# Patient Record
Sex: FEMALE | Race: WHITE | Employment: FULL TIME | URBAN - METROPOLITAN AREA
[De-identification: names, ages, dates, MRNs, and addresses within clinical notes are randomized per-mention and may not be internally consistent; named-entity substitution may affect disease eponyms.]

---

## 2021-03-19 ENCOUNTER — IMMUNIZATION (OUTPATIENT)
Dept: PRIMARY CARE CLINIC | Age: 41
End: 2021-03-19
Payer: COMMERCIAL

## 2021-03-19 PROCEDURE — 0011A COVID-19, MODERNA VACCINE 100MCG/0.5ML DOSE: CPT | Performed by: NURSE PRACTITIONER

## 2021-03-19 PROCEDURE — 91301 COVID-19, MODERNA VACCINE 100MCG/0.5ML DOSE: CPT | Performed by: NURSE PRACTITIONER

## 2021-04-19 ENCOUNTER — IMMUNIZATION (OUTPATIENT)
Dept: PRIMARY CARE CLINIC | Age: 41
End: 2021-04-19
Payer: COMMERCIAL

## 2021-04-19 PROCEDURE — 91301 COVID-19, MODERNA VACCINE 100MCG/0.5ML DOSE: CPT | Performed by: NURSE PRACTITIONER

## 2021-04-19 PROCEDURE — 0012A COVID-19, MODERNA VACCINE 100MCG/0.5ML DOSE: CPT | Performed by: NURSE PRACTITIONER

## 2021-11-24 ENCOUNTER — NURSE TRIAGE (OUTPATIENT)
Dept: OTHER | Facility: CLINIC | Age: 41
End: 2021-11-24

## 2023-09-03 PROBLEM — M54.2 CERVICALGIA: Status: ACTIVE | Noted: 2023-09-03

## 2023-09-03 PROBLEM — M54.59 MECHANICAL LOW BACK PAIN: Status: ACTIVE | Noted: 2023-09-03

## 2023-09-03 PROBLEM — R93.1 ABNORMAL ECHOCARDIOGRAM: Status: ACTIVE | Noted: 2023-09-03

## 2023-09-03 PROBLEM — R92.8 ABNORMAL MAMMOGRAM: Status: ACTIVE | Noted: 2023-09-03

## 2023-09-03 PROBLEM — K58.9 IRRITABLE BOWEL SYNDROME: Status: ACTIVE | Noted: 2023-09-03

## 2023-09-03 PROBLEM — M99.02 SEGMENTAL AND SOMATIC DYSFUNCTION OF THORACIC REGION: Status: ACTIVE | Noted: 2023-09-03

## 2023-09-03 PROBLEM — M99.01 SEGMENTAL AND SOMATIC DYSFUNCTION OF CERVICAL REGION: Status: ACTIVE | Noted: 2023-09-03

## 2023-09-03 PROBLEM — M54.9 CHRONIC BACK PAIN: Status: ACTIVE | Noted: 2023-09-03

## 2023-09-03 PROBLEM — M62.452 CONTRACTURE OF MUSCLE OF BOTH THIGHS: Status: ACTIVE | Noted: 2023-09-03

## 2023-09-03 PROBLEM — L30.9 DERMATITIS: Status: ACTIVE | Noted: 2023-09-03

## 2023-09-03 PROBLEM — J30.89 ALLERGY TO DUST: Status: ACTIVE | Noted: 2023-09-03

## 2023-09-03 PROBLEM — J30.1 ALLERGIC RHINITIS DUE TO POLLEN: Status: ACTIVE | Noted: 2023-09-03

## 2023-09-03 PROBLEM — Q21.12 PATENT FORAMEN OVALE (HHS-HCC): Status: ACTIVE | Noted: 2023-09-03

## 2023-09-03 PROBLEM — G89.29 CHRONIC BACK PAIN: Status: ACTIVE | Noted: 2023-09-03

## 2023-09-03 PROBLEM — M99.04 SACROILIAC JOINT SOMATIC DYSFUNCTION: Status: ACTIVE | Noted: 2023-09-03

## 2023-09-03 PROBLEM — J32.9 CHRONIC CONGESTION OF PARANASAL SINUS: Status: ACTIVE | Noted: 2023-09-03

## 2023-09-03 PROBLEM — M79.9 POSTURAL STRAIN: Status: ACTIVE | Noted: 2023-09-03

## 2023-09-03 PROBLEM — R01.1 HEART MURMUR: Status: ACTIVE | Noted: 2023-09-03

## 2023-09-03 PROBLEM — E66.9 CLASS 1 OBESITY WITH BODY MASS INDEX (BMI) OF 30.0 TO 30.9 IN ADULT: Status: ACTIVE | Noted: 2023-09-03

## 2023-09-03 PROBLEM — J45.909 ASTHMA (HHS-HCC): Status: ACTIVE | Noted: 2023-09-03

## 2023-09-03 PROBLEM — Z97.5 IUD (INTRAUTERINE DEVICE) IN PLACE: Status: ACTIVE | Noted: 2023-09-03

## 2023-09-03 PROBLEM — E66.811 CLASS 1 OBESITY WITH BODY MASS INDEX (BMI) OF 30.0 TO 30.9 IN ADULT: Status: ACTIVE | Noted: 2023-09-03

## 2023-09-03 PROBLEM — M99.03 SEGMENTAL AND SOMATIC DYSFUNCTION OF LUMBAR REGION: Status: ACTIVE | Noted: 2023-09-03

## 2023-09-03 PROBLEM — I37.0 PULMONARY VALVE STENOSIS: Status: ACTIVE | Noted: 2023-09-03

## 2023-09-03 PROBLEM — M62.451 CONTRACTURE OF MUSCLE OF BOTH THIGHS: Status: ACTIVE | Noted: 2023-09-03

## 2023-09-03 PROBLEM — J30.81 ALLERGY TO ANIMAL DANDER: Status: ACTIVE | Noted: 2023-09-03

## 2023-09-03 PROBLEM — E78.5 HYPERLIPIDEMIA: Status: ACTIVE | Noted: 2023-09-03

## 2023-09-03 RX ORDER — ASPIRIN 81 MG/1
1 TABLET ORAL DAILY
COMMUNITY
Start: 2022-10-11

## 2023-09-03 RX ORDER — ALBUTEROL SULFATE 90 UG/1
2 AEROSOL, METERED RESPIRATORY (INHALATION) EVERY 4 HOURS PRN
COMMUNITY
Start: 2021-09-10

## 2023-09-03 RX ORDER — LEVONORGESTREL 52 MG/1
INTRAUTERINE DEVICE INTRAUTERINE
COMMUNITY

## 2023-09-03 RX ORDER — MONTELUKAST SODIUM 10 MG/1
10 TABLET ORAL NIGHTLY PRN
COMMUNITY
Start: 2014-06-30

## 2023-10-10 ENCOUNTER — OFFICE VISIT (OUTPATIENT)
Dept: CARDIOLOGY | Facility: CLINIC | Age: 43
End: 2023-10-10
Payer: COMMERCIAL

## 2023-10-10 VITALS
TEMPERATURE: 98.7 F | HEIGHT: 64 IN | DIASTOLIC BLOOD PRESSURE: 84 MMHG | HEART RATE: 67 BPM | SYSTOLIC BLOOD PRESSURE: 130 MMHG | BODY MASS INDEX: 30.22 KG/M2 | WEIGHT: 177 LBS

## 2023-10-10 DIAGNOSIS — I37.0 MODERATE PULMONARY VALVE STENOSIS: Primary | ICD-10-CM

## 2023-10-10 DIAGNOSIS — R06.02 SHORTNESS OF BREATH ON EXERTION: ICD-10-CM

## 2023-10-10 DIAGNOSIS — E78.00 ELEVATED LDL CHOLESTEROL LEVEL: ICD-10-CM

## 2023-10-10 DIAGNOSIS — R07.89 ATYPICAL CHEST PAIN: ICD-10-CM

## 2023-10-10 DIAGNOSIS — Z78.9 NEVER SMOKED ANY SUBSTANCE: ICD-10-CM

## 2023-10-10 DIAGNOSIS — I37.0 PULMONARY VALVE STENOSIS, UNSPECIFIED ETIOLOGY: ICD-10-CM

## 2023-10-10 DIAGNOSIS — Q21.12 PATENT FORAMEN OVALE (HHS-HCC): ICD-10-CM

## 2023-10-10 PROCEDURE — 99214 OFFICE O/P EST MOD 30 MIN: CPT | Performed by: INTERNAL MEDICINE

## 2023-10-10 PROCEDURE — 93000 ELECTROCARDIOGRAM COMPLETE: CPT | Performed by: INTERNAL MEDICINE

## 2023-10-10 PROCEDURE — 3008F BODY MASS INDEX DOCD: CPT | Performed by: INTERNAL MEDICINE

## 2023-10-10 PROCEDURE — 1036F TOBACCO NON-USER: CPT | Performed by: INTERNAL MEDICINE

## 2023-10-10 ASSESSMENT — PATIENT HEALTH QUESTIONNAIRE - PHQ9
SUM OF ALL RESPONSES TO PHQ9 QUESTIONS 1 AND 2: 0
2. FEELING DOWN, DEPRESSED OR HOPELESS: NOT AT ALL
1. LITTLE INTEREST OR PLEASURE IN DOING THINGS: NOT AT ALL

## 2023-10-10 NOTE — PROGRESS NOTES
Patient:  Grisel Jean  YOB: 1980  MRN: [id number]       Chief Complaint/Active Symptoms:       Grisel Jean is a 43 y.o. female who returns today for cardiac follow-up.    Here for annual follow-up. No real problems. Every once in a while can feel her heart beat and has some aching left sided chest discomfort. Chest discomfort is usually at night when laying down. Aching in quality and can last for 20-30 minutes. No exacerbating symptoms. Takes an aspirin or tylenol and goes to sleep and it is gone when she gets up. Occasionally her left arm will get numb with the discomfort but not consistently. No shortness of breath. Can't remember the last time she got this but several times in the last month. Usually several times per month but unchanging in frequency or severity over this 3-4 months. No family history of premature CAD.     Feeling her heart beat is like a skipped sensation but no associated syncope or near syncope.     Has mild SOB with exercise but has no change in her exercise capacity. Does have more issues with climbing stairs. May a small change over the past year - does less this year than previous but more for body or joint discomfort than any shortness of breath. No edema, orthopnea or PND.     ROS:   General: Change in appetite denies. Chills denies. Fever denies.   Ophthalmologic: Blurred vision denies. Discharge denies. Eye pain denies.   ENT: Decreased hearing denies. Sore throat denies. Swollen glands denies.   Endocrine: Cold intolerance denies , Excessive thirst denies. Heat intolerance denies. Weight loss denies.   Respiratory: Cough kaleb when having allergy issues. Shortness of breath at rest denies. Wheezing denies.   Cardiovascular: Chest pain at rest denies. Chest pain with exertion denies. Irregular heartbeat denies.   Gastrointestinal: Abdominal pain denies. Diarrhea denies.Nausea, denies. Vomiting, denies.    Genitourinary: Blood in urine denies. Difficulty urinating denies.  Frequent urination denies.   Musculoskeletal: Occasional knee and hip pain. Weakness denies.   Skin: Dry skin denies. Itching denies. Michi denies.   Neurologic: Dizziness denies. Fainting denies. Headache denies.      Objective:     Vitals:    10/10/23 0859   BP: 130/84   Pulse: 67   Temp: 37.1 °C (98.7 °F)       Vitals:    10/10/23 0859   Weight: 80.3 kg (177 lb)       Allergies:     Allergies   Allergen Reactions    House Dust Unknown     And pollens    Pollen Extracts Hives          Medications:     Current Outpatient Medications   Medication Instructions    albuterol 90 mcg/actuation inhaler 2 puffs, inhalation, Every 4 hours PRN    aspirin 81 mg EC tablet 1 tablet, oral, Daily    cetirizine (ZyrTEC) 10 mg capsule 1 capsule, oral, Daily    levonorgestrel (Mirena) 21 mcg/24 hours (8 yrs) 52 mg IUD intrauterine    montelukast (SINGULAIR) 10 mg, oral, Nightly PRN       Physical Examination:   GENERAL:  Well developed, well nourished, in no acute distress.  HEENT: NC AT, EOMI with anicteric sclera  NECK:  Supple, no JVD, no bruit.  CHEST:  Symmetric and nontender.  LUNGS:  Clear to auscultation bilaterally, normal respiratory effort.  HEART: PMI is nonpalpable.  There is a regular rhythm with a normal S1 and diminished S2 just likely P2.  A2 is still well heard.  There is a crescendo pansystolic murmur heard at the upper sternal border but throughout the precordium.  No appreciable S3   ABDOMEN: Soft, NT, ND without palpable organomegaly or bruits  EXTREMITIES:  Warm with good color, no clubbing or cyanosis.  There is no edema noted.  PERIPHERAL VASCULAR:  Pulses present and equally palpable; 2+ throughout.  MUSCULOSKELETAL: No significant joint or limb deformity  NEURO/PSYCH:  Alert and oriented times three with approppriate behavior and responses. Nonfocal motor examination with normal gait and ambulation  Lymph: No significant palpable lymphadenopathy  Skin: no rash or lesions on exposed skin or reported.    Lab:  "    CBC:   Lab Results   Component Value Date    WBC 11.4 (H) 11/16/2022    RBC 5.05 11/16/2022    HGB 14.7 11/16/2022    HCT 45.0 11/16/2022     11/16/2022        CMP:    Lab Results   Component Value Date     (L) 11/16/2022    K 3.7 11/16/2022     11/16/2022    CO2 23 11/16/2022    BUN 12 11/16/2022    CREATININE 0.76 11/16/2022    GLUCOSE 79 11/16/2022    CALCIUM 9.7 11/16/2022       Magnesium:    No results found for: \"MG\"    Lipid Profile:    Lab Results   Component Value Date    TRIG 166 (H) 11/16/2022    HDL 57.0 11/16/2022       TSH:    Lab Results   Component Value Date    TSH 2.10 11/16/2022       BNP:   Lab Results   Component Value Date    BNP 17 09/23/2022        PT/INR:    No results found for: \"PROTIME\", \"INR\"    HgBA1c:    No results found for: \"HGBA1C\"    BMP:  Lab Results   Component Value Date     (L) 11/16/2022     09/23/2022    K 3.7 11/16/2022    K 4.1 09/23/2022     11/16/2022     09/23/2022    CO2 23 11/16/2022    CO2 24 09/23/2022    BUN 12 11/16/2022    BUN 16 09/23/2022    CREATININE 0.76 11/16/2022    CREATININE 0.93 09/23/2022       Cardiac Enzymes:    No results found for: \"TROPHS\"    Hepatic Function Panel:    Lab Results   Component Value Date    ALKPHOS 87 11/16/2022    ALT 24 11/16/2022    AST 21 11/16/2022    PROT 8.1 11/16/2022    BILITOT 0.9 11/16/2022         Diagnostic Studies:     No echocardiogram results found for the past 12 months    No nuclear medicine results found for the past 12 months    No valid procedures specified.  Reviewed echocardiogram and cardiac MRI from 2022.    EKG:   No results found for: \"EKG\"  Today's EKG showed sinus bradycardia with left axis deviation left atrial enlargement and left bundle branch block    Radiology:     No orders to display         ASSESSMENT     Diagnoses and all orders for this visit:  Moderate pulmonary valve stenosis  Pulmonary valve stenosis, unspecified etiology  -     ECG 12 lead " (Clinic Performed)  -     CBC; Future  -     Transthoracic echo (TTE) complete; Future  -     Referral to Adult Congenital Heart Disease Program; Future  Shortness of breath on exertion  -     CBC; Future  -     Transthoracic echo (TTE) complete; Future  -     Referral to Adult Congenital Heart Disease Program; Future  Atypical chest pain  -     Comprehensive Metabolic Panel; Future  Elevated LDL cholesterol level  -     CT cardiac scoring wo IV contrast; Future  -     Comprehensive Metabolic Panel; Future  -     Lipid Panel; Future  Patent foramen ovale  -     Referral to Adult Congenital Heart Disease Program; Future  Never smoked any substance  BMI 30.0-30.9,adult      PLAN   1.  Pulmonic stenosis.  The patient has moderate pulmonic stenosis by cardiac MRI last year.  She has some shortness of breath on exertion that may be mildly progressive compared to previous.  Her P2 is not heard on clinical examination today so we have requested a repeat echocardiogram to look at the velocities across her pulmonic valve and we referred her to the adult congenital cardiologist for assessment and follow of her condition.  2.  Shortness of breath on exertion.  While I have some concerns it could be related to changes in her pulmonic stenosis it may be more subtle than that and from other causes given that she still is able to exercise and has not noticed large changes.  3.  Atypical chest pain.  Her current chest discomfort is atypical but she has a markedly elevated LDL cholesterol.  She is still a little on the young side for coronary artery disease but will check a CT cardiac score.  If this is not high risk would follow her clinically.  4.  Elevated LDL cholesterol.  LDL cholesterol last year was 160.  She denies any first-degree family members with premature coronary artery disease.  She the recommendation for CT cardiac score as above.  We requested a new lipid panel with transaminases to assess where she is now when we  have that information we may need to proceed with treatment.  5.  Tobacco and weight status.  The patient is a lifelong non-smoker and borderline obese.  We have encouraged heart healthy Mediterranean diet.    Follow-up is dependent on the results of her CT cardiac score and echocardiogram.  We have discussed openly with the patient the referral to the adult congenital clinic for following her pulmonic stenosis and looking for markers of when to intervene there.  In terms of her atypical chest discomfort and her high cholesterol we will await the results of her new labs and CT cardiac score.  If her CT cardiac score is not high I would follow her clinically and discuss when to start and what type of medication to start once we have her new labs.  Patient is aware of these recommendations.

## 2023-10-16 ENCOUNTER — APPOINTMENT (OUTPATIENT)
Dept: CARDIOLOGY | Facility: CLINIC | Age: 43
End: 2023-10-16
Payer: COMMERCIAL

## 2023-10-17 ENCOUNTER — LAB (OUTPATIENT)
Dept: LAB | Facility: LAB | Age: 43
End: 2023-10-17
Payer: COMMERCIAL

## 2023-10-17 DIAGNOSIS — R07.89 ATYPICAL CHEST PAIN: ICD-10-CM

## 2023-10-17 DIAGNOSIS — R06.02 SHORTNESS OF BREATH ON EXERTION: ICD-10-CM

## 2023-10-17 DIAGNOSIS — E78.00 ELEVATED LDL CHOLESTEROL LEVEL: ICD-10-CM

## 2023-10-17 DIAGNOSIS — I37.0 PULMONARY VALVE STENOSIS, UNSPECIFIED ETIOLOGY: ICD-10-CM

## 2023-10-17 LAB
ALBUMIN SERPL BCP-MCNC: 3.9 G/DL (ref 3.4–5)
ALP SERPL-CCNC: 89 U/L (ref 33–110)
ALT SERPL W P-5'-P-CCNC: 22 U/L (ref 7–45)
ANION GAP SERPL CALC-SCNC: 11 MMOL/L (ref 10–20)
AST SERPL W P-5'-P-CCNC: 20 U/L (ref 9–39)
BILIRUB SERPL-MCNC: 0.6 MG/DL (ref 0–1.2)
BUN SERPL-MCNC: 13 MG/DL (ref 6–23)
CALCIUM SERPL-MCNC: 9.5 MG/DL (ref 8.6–10.3)
CHLORIDE SERPL-SCNC: 104 MMOL/L (ref 98–107)
CHOLEST SERPL-MCNC: 210 MG/DL (ref 0–199)
CHOLESTEROL/HDL RATIO: 4.9
CO2 SERPL-SCNC: 25 MMOL/L (ref 21–32)
CREAT SERPL-MCNC: 0.95 MG/DL (ref 0.5–1.05)
ERYTHROCYTE [DISTWIDTH] IN BLOOD BY AUTOMATED COUNT: 12.2 % (ref 11.5–14.5)
GFR SERPL CREATININE-BSD FRML MDRD: 76 ML/MIN/1.73M*2
GLUCOSE SERPL-MCNC: 116 MG/DL (ref 74–99)
HCT VFR BLD AUTO: 39.8 % (ref 36–46)
HDLC SERPL-MCNC: 43.1 MG/DL
HGB BLD-MCNC: 13.6 G/DL (ref 12–16)
LDLC SERPL CALC-MCNC: 106 MG/DL
MCH RBC QN AUTO: 29.4 PG (ref 26–34)
MCHC RBC AUTO-ENTMCNC: 34.2 G/DL (ref 32–36)
MCV RBC AUTO: 86 FL (ref 80–100)
NON HDL CHOLESTEROL: 167 MG/DL (ref 0–149)
NRBC BLD-RTO: 0 /100 WBCS (ref 0–0)
PLATELET # BLD AUTO: 324 X10*3/UL (ref 150–450)
PMV BLD AUTO: 9.3 FL (ref 7.5–11.5)
POTASSIUM SERPL-SCNC: 4.2 MMOL/L (ref 3.5–5.3)
PROT SERPL-MCNC: 7 G/DL (ref 6.4–8.2)
RBC # BLD AUTO: 4.62 X10*6/UL (ref 4–5.2)
SODIUM SERPL-SCNC: 136 MMOL/L (ref 136–145)
TRIGL SERPL-MCNC: 306 MG/DL (ref 0–149)
VLDL: 61 MG/DL (ref 0–40)
WBC # BLD AUTO: 9.1 X10*3/UL (ref 4.4–11.3)

## 2023-10-17 PROCEDURE — 36415 COLL VENOUS BLD VENIPUNCTURE: CPT

## 2023-10-17 PROCEDURE — 85027 COMPLETE CBC AUTOMATED: CPT

## 2023-10-17 PROCEDURE — 80053 COMPREHEN METABOLIC PANEL: CPT

## 2023-10-17 PROCEDURE — 80061 LIPID PANEL: CPT

## 2023-10-17 NOTE — RESULT ENCOUNTER NOTE
Cholesterol - LDL and triglycerides are elevated. Had CT cardiac score and am waiting for result. If > 100 will need to start medications to treat. For now would educate patient on a low cholesterol diet.

## 2023-10-26 PROBLEM — Q22.1 CONGENITAL PULMONARY VALVE STENOSIS (HHS-HCC): Status: ACTIVE | Noted: 2023-10-26

## 2023-10-26 NOTE — PROGRESS NOTES
Provider: Alirio Ibarra MD    Patient Name: Grisel Jean     : 1980       Date of Service: 2023  Referring: Duy      DIAGNOSES:   Congenital pulmonary valve stenosis  Peak gradient in  25 - 35 mm Hg depending on echo vs cMRI.  TTE 10/14/23 Peak 32 mm Hg, no PI    HISTORY OF PRESENT ILLNESS:    Dear Dr. Gordillo    I saw Grisel Jean today for Initial Visit in [facility],  [facility] and [facility] at [facility].  The patient is a   43 y.o. female with a history of congenital pulmonary valve stenosis.       Pt had an echo in 2022 showing moderate PS. She is very active and exercises frequently (spinning class, yoga, etc). During this she has some mild SOB but she does not feel like this is any worse than it has been in the past and is any more than would normally be expected. Symptoms do not limit her exercise tolerance. She has chest pressure sometimes when she lays down at night which self resolved in 20-30 mins. She never has CP during her exercise sessions. She denies dizziness/lightheadedness, palpitations, orthopnea and leg swelling.    PAST MEDICAL HISTORY  Past Medical History:   Diagnosis Date    Abnormal findings on diagnostic imaging of heart and coronary circulation 2022    Abnormal echocardiogram    Benign and innocent cardiac murmurs     Functional murmur    Personal history of diseases of the skin and subcutaneous tissue 2021    History of dermatitis    Personal history of other diseases of the circulatory system 2022    History of cardiac murmur    Personal history of other specified conditions 2021    History of abnormal mammogram       MEDICATIONS     Current Outpatient Medications:     albuterol 90 mcg/actuation inhaler, Inhale 2 puffs every 4 hours if needed for shortness of breath (every 4-6 hoursd as  needed)., Disp: , Rfl:     aspirin 81 mg EC tablet, Take 1 tablet (81 mg) by mouth once daily., Disp: , Rfl:     cetirizine (ZyrTEC) 10 mg capsule, Take 1 capsule (10 mg) by mouth once daily., Disp: , Rfl:     levonorgestrel (Mirena) 21 mcg/24 hours (8 yrs) 52 mg IUD, by intrauterine route., Disp: , Rfl:     montelukast (Singulair) 10 mg tablet, Take 1 tablet (10 mg) by mouth as needed at bedtime., Disp: , Rfl:     ALLERGY  House dust and Pollen extracts    FAMILY HISTORY  Family History   Problem Relation Name Age of Onset    Allergies Mother      Sinusitis Mother      Asthma Mother's Sister      Allergies Paternal Grandfather      Asthma Paternal Grandfather         SOCIAL HISTORY  Social History     Socioeconomic History    Marital status:      Spouse name: Not on file    Number of children: Not on file    Years of education: Not on file    Highest education level: Not on file   Occupational History    Not on file   Tobacco Use    Smoking status: Never    Smokeless tobacco: Never   Substance and Sexual Activity    Alcohol use: Yes     Alcohol/week: 2.0 standard drinks of alcohol     Types: 2 Glasses of wine per week    Drug use: Not Currently    Sexual activity: Not on file   Other Topics Concern    Not on file   Social History Narrative    Not on file     Social Determinants of Health     Financial Resource Strain: Not on file   Food Insecurity: Not on file   Transportation Needs: Not on file   Physical Activity: Not on file   Stress: Not on file   Social Connections: Not on file   Intimate Partner Violence: Not on file   Housing Stability: Not on file       REVIEW OF SYSTEMS:   Review of Systems   Constitutional: Negative.   HENT: Negative.     Eyes: Negative.    Cardiovascular:  Positive for chest pain and dyspnea on exertion. Negative for cyanosis, leg swelling, near-syncope, palpitations and paroxysmal nocturnal dyspnea.   Respiratory: Negative.     Endocrine: Negative.    Skin: Negative.   "  Gastrointestinal: Negative.    Neurological: Negative.       All systems negative unless otherwise stated.    PHYSICAL EXAM:  /87 (BP Location: Right arm)   Pulse 68   Temp 36.6 °C (97.9 °F)   Ht 1.63 m (5' 4.17\")   Wt 79.4 kg (175 lb 0.7 oz)   BMI 29.88 kg/m²   Body mass index is 29.88 kg/m².    General: Well appearing, No pain or distress, well nourished  Eyes: DENISE/EOMI, Conjuctiva Clear  ENT: External ears/nose normal  Neck: Supple  Respiratory: Clear to ausculation bilaterally; no wheezing/rales/rhonchi; respirations nonlabored  Cardiovascular: Regular rhythm, normal S1 and S2. No S3 or S4. 2/6 POONAM best heard at LUSB however also throughout precordium. No splitting appreciated (single S2). Carotid upstrokes brisk bilaterally  Gastrointestinal: soft, non-tender abdomen  Extremities: no cyanosis or clubbing or edema  Musculoskeletal: no obvious joint deformities  Psychiatric: normal affect  Neurologic: awake/alert, no focal deficits    DATA:    TTE 11/14/23 personally reviewed by me in clinic today showing preserved LVEF and mild pulmonic stenosis (peak gradient 32 mmHg) without pulmonic insufficiency.    cMRI 10/4/22  1. Normal left ventricular cavity size with preserved systolic   function. Ejection fraction 58%.. Docs  occult septal wall motion.      2. Delayed enhancement imaging is normal. No evidence of fibrosis,   infiltrative disease, previous micro  fraction, or inflammation of the left ventricular or right   ventricular myocardium.      3. Normal right ventricular cavity size with preserved systolic   function. RV EF 67%.      4. Pulmonic valve with moderate pulmonic valve stenosis, trivial   pulmonic valve insufficiency.      5. Patent foramina ovale.      6. Mild mitral valve regurgitation.      7. Mild tricuspid valve regurgitation.      8. Mild aortic valve regurgitation.      9. Normal thoracic aorta without evidence of aneurysm, dissection, or   coarctation.      LEFT VENTRICLE: " Quantitative LVEF 58 %. LV wall thickness is normal.   LV cavity size is normal. LV systolic function is  regionally impaired.     VIABILITY: Hyperenhancement is normal.     RIGHT VENTRICLE: Quantitative RVEF 67 %. RV wall thickness is normal.   RV cavity size is normal. RV systolic function is  normal.     LV/RV SEPTUM: The ventricular septum is intact.     LA/RA SEPTUM: There is a patent foramen ovale.     LEFT ATRIUM: LA cavity size is normal.     RIGHT ATRIUM: RA cavity size is normal.     PERICARDIUM: Pericardium is normal. There is no pericardial effusion.   There are no signs of increased intrapericardial  pressures.     PLEURAL EFFUSION: There is no pleural effusion.     AORTIC VALVE: Aortic valve is trileaflet. There is no aortic   stenosis. There is mild aortic regurgitation.     MITRAL VALVE: Mitral valve leaflets are normal. There is mild mitral   regurgitation.     TRICUSPID VALVE: Tricuspid valve leaflets are normal. There is mild   tricuspid regurgitation.     PULMONIC VALVE: Pulmonic valve leaflets are normal. There is a   moderate pulmonic stenosis. Planimetered pulmonic valve area  1.1 cm\S\2. pulmonic valve area by continuity equation 1.3cm2. Peak   pulmonic valve velocity 2.5 cm/sec. There  is trivial pulmonic regurgitation.     AORTIC ROOT: The aortic root is normal.        CORE EXAM   =====================================================================  =====================================     MEASUREMENTS   ---------------------------------------------------------------------  -------------------      VOLUMETRIC ANALYSIS       ----------------------------------------------  .-------------------------------------------------------.  .      .          . LV   . Reference . RV   . Reference .  +------+----------+------+-----------+------+-----------+  . EDV  . ml       .  165 .  () .  102 .  () .  .      . ml/m\S\2    .   89 .  (59-93)  .   55 .  (57-94)  .  . ESV  . ml       .   68  .  (25-62)  .   34 .  (20-72)  .  .      . ml/m\S\2    .   37 .  (16-34)  .   18 .  (14-40)  .  . CO   . L/min    . 4.33 .           . 3.05 .           .  .      . L/min/m\S\2 . 2.33 .           . 1.64 .           .  . MASS . g        .  137 .  () .      .           .  .      . g/m\S\2     .   74 .  (48-77)  .      .           .  . SV   . ml       .   96 .  () .   68 .  () .  .      . ml/m\S\2    .   52 .  (39-63)  .   37 .  (37-61)  .  . EF   . %        .   58 .  (58-76)  .   67 .  (53-77)  .  '------+----------+------+-----------+------+-----------'             CARDIAC OUTPUT HR:  45 BPM      LV DIMENSIONS       ----------------------------------------------          WALL THICKNESS - ANTEROSEPTAL:  0.78 cm          WALL THICKNESS - INFEROLATERAL:  0.80 cm          LV CHEIKH:  5.2 cm          LV ESD:  4.1 cm         LA DIMENSIONS (LV SYSTOLE)       ----------------------------------------------          DIAMETER:  3.8 cm          AREA - 2 CHAMBER:  24 cm\S\2          LENGTH - 2 CHAMBER:  5.6 cm          AREA - 4 CHAMBER:  22 cm\S\2          LENGTH - 4 CHAMBER:  6.4 cm          VOLUME:  80 ml          VOLUME NORMALIZED:  43.2 ml/m\S\2         RA DIMENSIONS (RV SYSTOLE)       ----------------------------------------------          DIAMETER:  4.2 cm          AREA - 4 CHAMBER:  16 cm\S\2          LENGTH - 4 CHAMBER:  4.7 cm         AORTIC ROOT DIMENSIONS       ----------------------------------------------          ANNULUS:  2.1 cm          SINUS OF VALSALVA:  3.2 cm          SINOTUBULAR JUNCTION:  2.5 cm         FLOW ANALYSIS       ----------------------------------------------          QP:  3 L/min          QS:  3 L/min          QP/QS:  0.93     TTE 8/5/22   1. The left ventricular systolic function is low normal with a 50-55% estimated ejection fraction.   2. There is low normal right ventricular systolic function.   3. There is moderate pulmonic valve stenosis.   4. The right ventricle is normal in  "size. There is low normal right ventricular systolic function.    WBC   Date Value Ref Range Status   10/17/2023 9.1 4.4 - 11.3 x10*3/uL Final     Hemoglobin   Date Value Ref Range Status   10/17/2023 13.6 12.0 - 16.0 g/dL Final     MCV   Date Value Ref Range Status   10/17/2023 86 80 - 100 fL Final     Creatinine   Date Value Ref Range Status   10/17/2023 0.95 0.50 - 1.05 mg/dL Final   11/16/2022 0.76 0.50 - 1.05 mg/dL Final   09/23/2022 0.93 0.50 - 1.05 mg/dL Final       No components found for: \"MAGNESIUM\"  Total Protein   Date Value Ref Range Status   10/17/2023 7.0 6.4 - 8.2 g/dL Final     Albumin   Date Value Ref Range Status   10/17/2023 3.9 3.4 - 5.0 g/dL Final     ALT   Date Value Ref Range Status   10/17/2023 22 7 - 45 U/L Final     Comment:     Patients treated with Sulfasalazine may generate falsely decreased results for ALT.     AST   Date Value Ref Range Status   10/17/2023 20 9 - 39 U/L Final     No results found for: \"INR\", \"APTT\"        ASSESSMENT & PLAN:       Congenital pulmonary valve stenosis    Echo today shows mild PS (PG 32mmHg) without KS, trivial MR and preserved LVEF.     - She has excellent exercise tolerance with no recent change in symptoms and any GONZALEZ she does have is not limiting.  She does not need annual echo or FU given this mild degree of PS.    -Follow-up in two years with echo.  -Does not need SBE ppx for dental procedures.    Ricardo Vazquez MD, PhD  Cardiology Fellow, PGY-4     I saw and evaluated the patient. I personally obtained the key and critical portions of the history and physical exam or was physically present for key and critical portions performed by the resident/fellow. I reviewed the resident/fellow's documentation and discussed the patient with the resident/fellow. I agree with the resident/fellow's medical decision making as documented in the note.    Alirio Ibarra MD     Thank you for allowing me to participate in the care of this patient.  Please don't " hesitate to contact us with any questions or concerns.    Sincerely,     Alirio Ibarra MD MSC  Director, Adult Congenital Heart Disease Program   [facility] [place]

## 2023-10-27 ENCOUNTER — APPOINTMENT (OUTPATIENT)
Dept: ALLERGY | Facility: CLINIC | Age: 43
End: 2023-10-27
Payer: COMMERCIAL

## 2023-10-31 ENCOUNTER — TELEPHONE (OUTPATIENT)
Dept: PRIMARY CARE | Facility: CLINIC | Age: 43
End: 2023-10-31

## 2023-10-31 ENCOUNTER — CLINICAL SUPPORT (OUTPATIENT)
Dept: ALLERGY | Facility: CLINIC | Age: 43
End: 2023-10-31
Payer: COMMERCIAL

## 2023-10-31 DIAGNOSIS — J30.9 ALLERGIC RHINITIS, UNSPECIFIED SEASONALITY, UNSPECIFIED TRIGGER: ICD-10-CM

## 2023-10-31 PROCEDURE — 95117 IMMUNOTHERAPY INJECTIONS: CPT | Performed by: ALLERGY & IMMUNOLOGY

## 2023-10-31 NOTE — TELEPHONE ENCOUNTER
Spoke with patient about LDL and triglycerides elevation, patient will do calcium scoring 11/14/23,  patient is aware  if calcium scoring is >100 provider will start medications to treat and educated patient to start a low cholesterol diet.

## 2023-11-13 ENCOUNTER — TELEPHONE (OUTPATIENT)
Dept: PEDIATRIC CARDIOLOGY | Facility: HOSPITAL | Age: 43
End: 2023-11-13
Payer: COMMERCIAL

## 2023-11-13 NOTE — TELEPHONE ENCOUNTER
11/13/23 at 10:39 AM     Called: [phone]  Spoke with: Patient    Confirmed upcoming appointment as follows:  Dr. Ibarra  Location:  [place]  Date: 11/14  Time: echo at 1 and appointment at 2    Reviewed medications and allergies.   Patient confirmed understanding of appointment details, asked if she needs to get an echo because she just had one done. Let her know I would check with Dr. Ibarra and get back to her. No further questions or issues to be addressed. Encouraged reaching out if there was anything else needed.        - Brissa Zoila, RN  [facility] Patient Navigator  [phone]    ________________________________________________  11/13/23 at 11:02 AM     Called: [phone]     Called to clarify if patient had ECG or echo. After further explanation of each test, patient confirmed she had an ECG.   No further questions or issues to be addressed. Encouraged reaching out if there was anything else needed.     - RAJI Hilton Patient Navigator  [phone]

## 2023-11-13 NOTE — PATIENT INSTRUCTIONS
Your echo today showed only mild narrowing (stenosis) of the pulmonary valve.  We don't need another echo for 2 years.  If you have new or otherwise concerning symptoms, please feel free to call the office.      Follow up with: Alirio Ibarra MD    Date: 2 years with echo. Our coordinator, Rose, will contact you closer to this time period to schedule. Our scheduling number is [phone].   Recommendations:   Endocarditis prophylaxis: You do not need antibiotics before dental cleanings and procedures. Please see the dentist every 6 months for a teeth cleaning.     [facility] program contact information:  [facility]: [phone]  [facility] Coordinator: [phone] (scheduling)  After hours: call [phone] to page on-call pediatric cardiology fellow at [phone]  Email: [email or web address]  Nort[facility] Emily  **If your pharmacy has any problems requesting refills from us for your cardiac medications, please contact us. Please notify us at least 2 business days before the refill is needed.

## 2023-11-14 ENCOUNTER — ANCILLARY PROCEDURE (OUTPATIENT)
Dept: PEDIATRIC CARDIOLOGY | Facility: CLINIC | Age: 43
End: 2023-11-14
Payer: COMMERCIAL

## 2023-11-14 ENCOUNTER — HOSPITAL ENCOUNTER (OUTPATIENT)
Dept: RADIOLOGY | Facility: HOSPITAL | Age: 43
Discharge: HOME | End: 2023-11-14
Payer: COMMERCIAL

## 2023-11-14 ENCOUNTER — OFFICE VISIT (OUTPATIENT)
Dept: PEDIATRIC CARDIOLOGY | Facility: CLINIC | Age: 43
End: 2023-11-14
Payer: COMMERCIAL

## 2023-11-14 VITALS
BODY MASS INDEX: 29.88 KG/M2 | WEIGHT: 175.04 LBS | TEMPERATURE: 97.9 F | HEIGHT: 64 IN | DIASTOLIC BLOOD PRESSURE: 87 MMHG | SYSTOLIC BLOOD PRESSURE: 130 MMHG | HEART RATE: 68 BPM

## 2023-11-14 DIAGNOSIS — I37.0 PULMONARY VALVE STENOSIS, UNSPECIFIED ETIOLOGY: ICD-10-CM

## 2023-11-14 DIAGNOSIS — Q21.12 PATENT FORAMEN OVALE (HHS-HCC): ICD-10-CM

## 2023-11-14 DIAGNOSIS — E78.00 ELEVATED LDL CHOLESTEROL LEVEL: ICD-10-CM

## 2023-11-14 DIAGNOSIS — I37.0 MODERATE PULMONARY VALVE STENOSIS: ICD-10-CM

## 2023-11-14 DIAGNOSIS — R06.02 SHORTNESS OF BREATH ON EXERTION: ICD-10-CM

## 2023-11-14 DIAGNOSIS — Q22.1 CONGENITAL PULMONARY VALVE STENOSIS (HHS-HCC): Primary | ICD-10-CM

## 2023-11-14 LAB
EJECTION FRACTION APICAL 4 CHAMBER: 63
MITRAL VALVE E/A RATIO: 1.13
PULMONIC VALVE PEAK GRADIENT: 29.5

## 2023-11-14 PROCEDURE — 93320 DOPPLER ECHO COMPLETE: CPT | Performed by: PEDIATRICS

## 2023-11-14 PROCEDURE — 1036F TOBACCO NON-USER: CPT | Performed by: INTERNAL MEDICINE

## 2023-11-14 PROCEDURE — 93325 DOPPLER ECHO COLOR FLOW MAPG: CPT | Performed by: PEDIATRICS

## 2023-11-14 PROCEDURE — 99204 OFFICE O/P NEW MOD 45 MIN: CPT | Performed by: INTERNAL MEDICINE

## 2023-11-14 PROCEDURE — 93303 ECHO TRANSTHORACIC: CPT | Performed by: PEDIATRICS

## 2023-11-14 PROCEDURE — 3008F BODY MASS INDEX DOCD: CPT | Performed by: INTERNAL MEDICINE

## 2023-11-14 PROCEDURE — 75571 CT HRT W/O DYE W/CA TEST: CPT

## 2023-11-14 ASSESSMENT — ENCOUNTER SYMPTOMS
ENDOCRINE NEGATIVE: 1
PALPITATIONS: 0
PND: 0
GASTROINTESTINAL NEGATIVE: 1
NEUROLOGICAL NEGATIVE: 1
CONSTITUTIONAL NEGATIVE: 1
RESPIRATORY NEGATIVE: 1
DYSPNEA ON EXERTION: 1
NEAR-SYNCOPE: 0
EYES NEGATIVE: 1

## 2023-11-14 NOTE — LETTER
2023     Mariela Gordillo MD  [address]  [facility] [address]    Patient: Grisel Jean   YOB: 1980   Date of Visit: 2023       Dear Dr. Mariela Gordillo MD:    Thank you for referring Grisel Jean to me for evaluation. Below are my notes for this consultation.  If you have questions, please do not hesitate to call me. I look forward to following your patient along with you.       Sincerely,     Alirio Ibarra MD      CC: Frances Gomez MD  ______________________________________________________________________________________    Provider: Alirio Ibarra MD    Patient Name: Grisel Jean     : 1980       Date of Service: 2023  Referring: Duy      DIAGNOSES:   Congenital pulmonary valve stenosis  Peak gradient in  25 - 35 mm Hg depending on echo vs cMRI.  TTE 10/14/23 Peak 32 mm Hg, no PI    HISTORY OF PRESENT ILLNESS:    Dear Dr. Gordillo    I saw Grisel Jean today for Initial Visit in [facility], [facility] and [facility] at [facility] multispecialty clinic.  The patient is a   43 y.o. female with a history of congenital pulmonary valve stenosis.       Pt had an echo in 2022 showing moderate PS. She is very active and exercises frequently (spinning class, yoga, etc). During this she has some mild SOB but she does not feel like this is any worse than it has been in the past and is any more than would normally be expected. Symptoms do not limit her exercise tolerance. She has chest pressure sometimes when she lays down at night which self resolved in 20-30 mins. She never has CP during her exercise sessions. She denies dizziness/lightheadedness, palpitations, orthopnea and leg swelling.    PAST MEDICAL HISTORY  Past Medical History:   Diagnosis Date   • Abnormal findings on diagnostic imaging of heart and coronary  circulation 08/26/2022    Abnormal echocardiogram   • Benign and innocent cardiac murmurs     Functional murmur   • Personal history of diseases of the skin and subcutaneous tissue 02/22/2021    History of dermatitis   • Personal history of other diseases of the circulatory system 11/11/2022    History of cardiac murmur   • Personal history of other specified conditions 09/24/2021    History of abnormal mammogram       MEDICATIONS     Current Outpatient Medications:   •  albuterol 90 mcg/actuation inhaler, Inhale 2 puffs every 4 hours if needed for shortness of breath (every 4-6 hoursd as needed)., Disp: , Rfl:   •  aspirin 81 mg EC tablet, Take 1 tablet (81 mg) by mouth once daily., Disp: , Rfl:   •  cetirizine (ZyrTEC) 10 mg capsule, Take 1 capsule (10 mg) by mouth once daily., Disp: , Rfl:   •  levonorgestrel (Mirena) 21 mcg/24 hours (8 yrs) 52 mg IUD, by intrauterine route., Disp: , Rfl:   •  montelukast (Singulair) 10 mg tablet, Take 1 tablet (10 mg) by mouth as needed at bedtime., Disp: , Rfl:     ALLERGY  House dust and Pollen extracts    FAMILY HISTORY  Family History   Problem Relation Name Age of Onset   • Allergies Mother     • Sinusitis Mother     • Asthma Mother's Sister     • Allergies Paternal Grandfather     • Asthma Paternal Grandfather         SOCIAL HISTORY  Social History     Socioeconomic History   • Marital status:      Spouse name: Not on file   • Number of children: Not on file   • Years of education: Not on file   • Highest education level: Not on file   Occupational History   • Not on file   Tobacco Use   • Smoking status: Never   • Smokeless tobacco: Never   Substance and Sexual Activity   • Alcohol use: Yes     Alcohol/week: 2.0 standard drinks of alcohol     Types: 2 Glasses of wine per week   • Drug use: Not Currently   • Sexual activity: Not on file   Other Topics Concern   • Not on file   Social History Narrative   • Not on file     Social Determinants of Health     Financial  "Resource Strain: Not on file   Food Insecurity: Not on file   Transportation Needs: Not on file   Physical Activity: Not on file   Stress: Not on file   Social Connections: Not on file   Intimate Partner Violence: Not on file   Housing Stability: Not on file       REVIEW OF SYSTEMS:   Review of Systems   Constitutional: Negative.   HENT: Negative.     Eyes: Negative.    Cardiovascular:  Positive for chest pain and dyspnea on exertion. Negative for cyanosis, leg swelling, near-syncope, palpitations and paroxysmal nocturnal dyspnea.   Respiratory: Negative.     Endocrine: Negative.    Skin: Negative.    Gastrointestinal: Negative.    Neurological: Negative.       All systems negative unless otherwise stated.    PHYSICAL EXAM:  /87 (BP Location: Right arm)   Pulse 68   Temp 36.6 °C (97.9 °F)   Ht 1.63 m (5' 4.17\")   Wt 79.4 kg (175 lb 0.7 oz)   BMI 29.88 kg/m²   Body mass index is 29.88 kg/m².    General: Well appearing, No pain or distress, well nourished  Eyes: DENISE/EOMI, Conjuctiva Clear  ENT: External ears/nose normal  Neck: Supple  Respiratory: Clear to ausculation bilaterally; no wheezing/rales/rhonchi; respirations nonlabored  Cardiovascular: Regular rhythm, normal S1 and S2. No S3 or S4. 2/6 POONAM best heard at LUSB however also throughout precordium. No splitting appreciated (single S2). Carotid upstrokes brisk bilaterally  Gastrointestinal: soft, non-tender abdomen  Extremities: no cyanosis or clubbing or edema  Musculoskeletal: no obvious joint deformities  Psychiatric: normal affect  Neurologic: awake/alert, no focal deficits    DATA:    TTE 11/14/23 personally reviewed by me in clinic today showing preserved LVEF and mild pulmonic stenosis (peak gradient 32 mmHg) without pulmonic insufficiency.    cMRI 10/4/22  1. Normal left ventricular cavity size with preserved systolic   function. Ejection fraction 58%.. Docs  occult septal wall motion.      2. Delayed enhancement imaging is normal. No " evidence of fibrosis,   infiltrative disease, previous micro  fraction, or inflammation of the left ventricular or right   ventricular myocardium.      3. Normal right ventricular cavity size with preserved systolic   function. RV EF 67%.      4. Pulmonic valve with moderate pulmonic valve stenosis, trivial   pulmonic valve insufficiency.      5. Patent foramina ovale.      6. Mild mitral valve regurgitation.      7. Mild tricuspid valve regurgitation.      8. Mild aortic valve regurgitation.      9. Normal thoracic aorta without evidence of aneurysm, dissection, or   coarctation.      LEFT VENTRICLE: Quantitative LVEF 58 %. LV wall thickness is normal.   LV cavity size is normal. LV systolic function is  regionally impaired.     VIABILITY: Hyperenhancement is normal.     RIGHT VENTRICLE: Quantitative RVEF 67 %. RV wall thickness is normal.   RV cavity size is normal. RV systolic function is  normal.     LV/RV SEPTUM: The ventricular septum is intact.     LA/RA SEPTUM: There is a patent foramen ovale.     LEFT ATRIUM: LA cavity size is normal.     RIGHT ATRIUM: RA cavity size is normal.     PERICARDIUM: Pericardium is normal. There is no pericardial effusion.   There are no signs of increased intrapericardial  pressures.     PLEURAL EFFUSION: There is no pleural effusion.     AORTIC VALVE: Aortic valve is trileaflet. There is no aortic   stenosis. There is mild aortic regurgitation.     MITRAL VALVE: Mitral valve leaflets are normal. There is mild mitral   regurgitation.     TRICUSPID VALVE: Tricuspid valve leaflets are normal. There is mild   tricuspid regurgitation.     PULMONIC VALVE: Pulmonic valve leaflets are normal. There is a   moderate pulmonic stenosis. Planimetered pulmonic valve area  1.1 cm\S\2. pulmonic valve area by continuity equation 1.3cm2. Peak   pulmonic valve velocity 2.5 cm/sec. There  is trivial pulmonic regurgitation.     AORTIC ROOT: The aortic root is normal.        CORE EXAM    =====================================================================  =====================================     MEASUREMENTS   ---------------------------------------------------------------------  -------------------      VOLUMETRIC ANALYSIS       ----------------------------------------------  .-------------------------------------------------------.  .      .          . LV   . Reference . RV   . Reference .  +------+----------+------+-----------+------+-----------+  . EDV  . ml       .  165 .  () .  102 .  () .  .      . ml/m\S\2    .   89 .  (59-93)  .   55 .  (57-94)  .  . ESV  . ml       .   68 .  (25-62)  .   34 .  (20-72)  .  .      . ml/m\S\2    .   37 .  (16-34)  .   18 .  (14-40)  .  . CO   . L/min    . 4.33 .           . 3.05 .           .  .      . L/min/m\S\2 . 2.33 .           . 1.64 .           .  . MASS . g        .  137 .  () .      .           .  .      . g/m\S\2     .   74 .  (48-77)  .      .           .  . SV   . ml       .   96 .  () .   68 .  () .  .      . ml/m\S\2    .   52 .  (39-63)  .   37 .  (37-61)  .  . EF   . %        .   58 .  (58-76)  .   67 .  (53-77)  .  '------+----------+------+-----------+------+-----------'             CARDIAC OUTPUT HR:  45 BPM      LV DIMENSIONS       ----------------------------------------------          WALL THICKNESS - ANTEROSEPTAL:  0.78 cm          WALL THICKNESS - INFEROLATERAL:  0.80 cm          LV CHEIKH:  5.2 cm          LV ESD:  4.1 cm         LA DIMENSIONS (LV SYSTOLE)       ----------------------------------------------          DIAMETER:  3.8 cm          AREA - 2 CHAMBER:  24 cm\S\2          LENGTH - 2 CHAMBER:  5.6 cm          AREA - 4 CHAMBER:  22 cm\S\2          LENGTH - 4 CHAMBER:  6.4 cm          VOLUME:  80 ml          VOLUME NORMALIZED:  43.2 ml/m\S\2         RA DIMENSIONS (RV SYSTOLE)       ----------------------------------------------          DIAMETER:  4.2 cm          AREA - 4 CHAMBER:  16 cm\S\2          " LENGTH - 4 CHAMBER:  4.7 cm         AORTIC ROOT DIMENSIONS       ----------------------------------------------          ANNULUS:  2.1 cm          SINUS OF VALSALVA:  3.2 cm          SINOTUBULAR JUNCTION:  2.5 cm         FLOW ANALYSIS       ----------------------------------------------          QP:  3 L/min          QS:  3 L/min          QP/QS:  0.93     TTE 8/5/22   1. The left ventricular systolic function is low normal with a 50-55% estimated ejection fraction.   2. There is low normal right ventricular systolic function.   3. There is moderate pulmonic valve stenosis.   4. The right ventricle is normal in size. There is low normal right ventricular systolic function.    WBC   Date Value Ref Range Status   10/17/2023 9.1 4.4 - 11.3 x10*3/uL Final     Hemoglobin   Date Value Ref Range Status   10/17/2023 13.6 12.0 - 16.0 g/dL Final     MCV   Date Value Ref Range Status   10/17/2023 86 80 - 100 fL Final     Creatinine   Date Value Ref Range Status   10/17/2023 0.95 0.50 - 1.05 mg/dL Final   11/16/2022 0.76 0.50 - 1.05 mg/dL Final   09/23/2022 0.93 0.50 - 1.05 mg/dL Final       No components found for: \"MAGNESIUM\"  Total Protein   Date Value Ref Range Status   10/17/2023 7.0 6.4 - 8.2 g/dL Final     Albumin   Date Value Ref Range Status   10/17/2023 3.9 3.4 - 5.0 g/dL Final     ALT   Date Value Ref Range Status   10/17/2023 22 7 - 45 U/L Final     Comment:     Patients treated with Sulfasalazine may generate falsely decreased results for ALT.     AST   Date Value Ref Range Status   10/17/2023 20 9 - 39 U/L Final     No results found for: \"INR\", \"APTT\"        ASSESSMENT & PLAN:       Congenital pulmonary valve stenosis    Echo today shows mild PS (PG 32mmHg) without IN, trivial MR and preserved LVEF.     - She has excellent exercise tolerance with no recent change in symptoms and any GONZALEZ she does have is not limiting.  She does not need annual echo or FU given this mild degree of PS.    -Follow-up in two years with " echo.  -Does not need SBE ppx for dental procedures.    Ricardo Vazquez MD, PhD  Cardiology Fellow, PGY-4     I saw and evaluated the patient. I personally obtained the key and critical portions of the history and physical exam or was physically present for key and critical portions performed by the resident/fellow. I reviewed the resident/fellow's documentation and discussed the patient with the resident/fellow. I agree with the resident/fellow's medical decision making as documented in the note.    Alirio Ibarra MD     Thank you for allowing me to participate in the care of this patient.  Please don't hesitate to contact us with any questions or concerns.    Sincerely,     Alirio Ibarra MD MSC  Director, Adult Congenital Heart Disease Program  [facility] [place]

## 2023-11-20 ENCOUNTER — OFFICE VISIT (OUTPATIENT)
Dept: PRIMARY CARE | Facility: CLINIC | Age: 43
End: 2023-11-20
Payer: COMMERCIAL

## 2023-11-20 VITALS
HEIGHT: 65 IN | SYSTOLIC BLOOD PRESSURE: 110 MMHG | BODY MASS INDEX: 29.02 KG/M2 | DIASTOLIC BLOOD PRESSURE: 70 MMHG | TEMPERATURE: 97.6 F | OXYGEN SATURATION: 98 % | WEIGHT: 174.2 LBS | HEART RATE: 55 BPM

## 2023-11-20 DIAGNOSIS — Z00.00 ANNUAL PHYSICAL EXAM: Primary | ICD-10-CM

## 2023-11-20 DIAGNOSIS — Z12.31 ENCOUNTER FOR SCREENING MAMMOGRAM FOR MALIGNANT NEOPLASM OF BREAST: ICD-10-CM

## 2023-11-20 DIAGNOSIS — E78.2 MIXED HYPERLIPIDEMIA: ICD-10-CM

## 2023-11-20 PROBLEM — M62.452 CONTRACTURE OF MUSCLE OF BOTH THIGHS: Status: RESOLVED | Noted: 2023-09-03 | Resolved: 2023-11-20

## 2023-11-20 PROBLEM — M62.451 CONTRACTURE OF MUSCLE OF BOTH THIGHS: Status: RESOLVED | Noted: 2023-09-03 | Resolved: 2023-11-20

## 2023-11-20 PROCEDURE — 90471 IMMUNIZATION ADMIN: CPT | Performed by: INTERNAL MEDICINE

## 2023-11-20 PROCEDURE — 99396 PREV VISIT EST AGE 40-64: CPT | Performed by: INTERNAL MEDICINE

## 2023-11-20 PROCEDURE — 3008F BODY MASS INDEX DOCD: CPT | Performed by: INTERNAL MEDICINE

## 2023-11-20 PROCEDURE — 90686 IIV4 VACC NO PRSV 0.5 ML IM: CPT | Performed by: INTERNAL MEDICINE

## 2023-11-20 PROCEDURE — 1036F TOBACCO NON-USER: CPT | Performed by: INTERNAL MEDICINE

## 2023-11-20 ASSESSMENT — ENCOUNTER SYMPTOMS
CONSTIPATION: 0
LIGHT-HEADEDNESS: 0
UNEXPECTED WEIGHT CHANGE: 0
BLOOD IN STOOL: 0
HEADACHES: 0
DIARRHEA: 0
VOMITING: 0
WHEEZING: 0
ACTIVITY CHANGE: 0
TREMORS: 0
VOICE CHANGE: 0
DIZZINESS: 0
CHEST TIGHTNESS: 0
COUGH: 0
APPETITE CHANGE: 0
TROUBLE SWALLOWING: 0
DIFFICULTY URINATING: 0
ARTHRALGIAS: 0
PALPITATIONS: 0
NAUSEA: 0
FATIGUE: 0
HEMATURIA: 0

## 2023-11-20 ASSESSMENT — PATIENT HEALTH QUESTIONNAIRE - PHQ9
SUM OF ALL RESPONSES TO PHQ9 QUESTIONS 1 AND 2: 0
1. LITTLE INTEREST OR PLEASURE IN DOING THINGS: NOT AT ALL
2. FEELING DOWN, DEPRESSED OR HOPELESS: NOT AT ALL

## 2023-11-20 NOTE — PROGRESS NOTES
"Subjective   Patient ID: Grisel Jean is a 43 y.o. female who presents for Annual Exam.    42 yo here for a physical    Mild Poulm Stenosis ; Coronary calcium nscore 130         Patient presents today for annual exam     Patient denies any additional concerns at this time.    Review of Systems   Constitutional:  Negative for activity change, appetite change, fatigue and unexpected weight change.   HENT:  Negative for ear pain, hearing loss, tinnitus, trouble swallowing and voice change.    Eyes:  Negative for visual disturbance.   Respiratory:  Negative for cough, chest tightness and wheezing.    Cardiovascular:  Negative for chest pain, palpitations and leg swelling.   Gastrointestinal:  Negative for blood in stool, constipation, diarrhea, nausea and vomiting.   Genitourinary:  Negative for difficulty urinating, hematuria and vaginal bleeding.   Musculoskeletal:  Negative for arthralgias.   Skin:  Negative for rash.   Neurological:  Negative for dizziness, tremors, syncope, light-headedness and headaches.       Objective   /70 (BP Location: Right arm, Patient Position: Sitting)   Pulse 55   Temp 36.4 °C (97.6 °F)   Ht 1.638 m (5' 4.5\")   Wt 79 kg (174 lb 3.2 oz)   SpO2 98%   BMI 29.44 kg/m²     Physical Exam  Constitutional:       General: She is not in acute distress.     Appearance: She is well-developed. She is not diaphoretic.   HENT:      Head: Normocephalic.      Right Ear: Tympanic membrane normal. There is no impacted cerumen.      Left Ear: Tympanic membrane normal. There is no impacted cerumen.      Nose: Nose normal.      Mouth/Throat:      Mouth: Mucous membranes are moist.      Pharynx: Oropharynx is clear. No oropharyngeal exudate or posterior oropharyngeal erythema.   Eyes:      General: No scleral icterus.     Extraocular Movements: Extraocular movements intact.      Conjunctiva/sclera: Conjunctivae normal.      Pupils: Pupils are equal, round, and reactive to light.   Neck:      Thyroid: " No thyromegaly.      Vascular: No JVD.   Cardiovascular:      Rate and Rhythm: Normal rate and regular rhythm.      Pulses: Normal pulses.      Heart sounds: Normal heart sounds. No murmur heard.     No friction rub. No gallop.   Pulmonary:      Effort: Pulmonary effort is normal. No respiratory distress.      Breath sounds: Normal breath sounds. No wheezing or rales.   Chest:      Chest wall: No tenderness.   Abdominal:      General: Bowel sounds are normal. There is no distension.      Palpations: Abdomen is soft. There is no mass.      Tenderness: There is no abdominal tenderness. There is no rebound.   Musculoskeletal:         General: Normal range of motion.      Cervical back: Normal range of motion and neck supple.   Lymphadenopathy:      Cervical: No cervical adenopathy.   Skin:     General: Skin is warm and dry.   Neurological:      General: No focal deficit present.      Mental Status: She is alert and oriented to person, place, and time.      Deep Tendon Reflexes: Reflexes normal.   Psychiatric:         Mood and Affect: Mood normal.         Thought Content: Thought content normal.         Assessment/Plan   Problem List Items Addressed This Visit             ICD-10-CM    Hyperlipidemia E78.5     Very different from last year   Will repeat  If elevated recommend Atorvastatin 10mg daily          Other Visit Diagnoses         Codes    Annual physical exam    -  Primary Z00.00    Relevant Orders    Lipid Panel    Encounter for screening mammogram for malignant neoplasm of breast     Z12.31    Relevant Orders    BI mammo bilateral screening tomosynthesis

## 2023-12-06 ENCOUNTER — HOSPITAL ENCOUNTER (OUTPATIENT)
Dept: RADIOLOGY | Facility: HOSPITAL | Age: 43
Discharge: HOME | End: 2023-12-06
Payer: COMMERCIAL

## 2023-12-06 VITALS — WEIGHT: 174 LBS | HEIGHT: 65 IN | BODY MASS INDEX: 28.99 KG/M2

## 2023-12-06 DIAGNOSIS — Z12.31 ENCOUNTER FOR SCREENING MAMMOGRAM FOR MALIGNANT NEOPLASM OF BREAST: ICD-10-CM

## 2023-12-06 PROCEDURE — 77063 BREAST TOMOSYNTHESIS BI: CPT

## 2023-12-06 PROCEDURE — 77067 SCR MAMMO BI INCL CAD: CPT | Performed by: RADIOLOGY

## 2023-12-06 PROCEDURE — 77063 BREAST TOMOSYNTHESIS BI: CPT | Performed by: RADIOLOGY

## 2023-12-06 NOTE — PROGRESS NOTES
Grisel Jean is a 43 y.o. G 1(1001)      Last Gyn Visit: 11/29/2022  Last pap: 11/29/2022 neg X 2  Mammogram: 11/20/2023 unremarkable  Colonoscopy: Due at 44       Mirena placed 2017  Has had slight infrequent spotting over the past year    Patient is an .        General:   Alert and oriented, in no acute distress   Neck: Supple. No visible thyromegaly.    Breast/Axilla: Normal to palpation bilaterally without masses, skin changes, or nipple discharge.    Abdomen: Soft, non-tender, without masses or organomegaly   Vulva: Normal architecture without erythema, masses, or lesions.    Vagina: Normal mucosa without lesions, masses, or atrophy. No abnormal vaginal discharge.    Cervix: Normal without masses, lesions, or signs of cervicitis.    Uterus: Normal mobile, non-enlarged uterus    Adnexa: Normal without masses or lesions   Pelvic Floor No POP noted. No high tone pelvic floor    Psych Normal affect. Normal mood.          Assessment and Plan:  Normal exam  Continue with Mirena x 8 years  Mammogram up-to-date  Pap due 2027

## 2023-12-08 ENCOUNTER — OFFICE VISIT (OUTPATIENT)
Dept: ALLERGY | Facility: CLINIC | Age: 43
End: 2023-12-08
Payer: COMMERCIAL

## 2023-12-08 VITALS — WEIGHT: 174 LBS | HEART RATE: 70 BPM | BODY MASS INDEX: 29.41 KG/M2 | OXYGEN SATURATION: 97 %

## 2023-12-08 DIAGNOSIS — J30.1 ALLERGIC RHINITIS DUE TO POLLEN, UNSPECIFIED SEASONALITY: Primary | ICD-10-CM

## 2023-12-08 DIAGNOSIS — J45.909 ASTHMA, UNSPECIFIED ASTHMA SEVERITY, UNSPECIFIED WHETHER COMPLICATED, UNSPECIFIED WHETHER PERSISTENT (HHS-HCC): ICD-10-CM

## 2023-12-08 PROCEDURE — 3008F BODY MASS INDEX DOCD: CPT | Performed by: ALLERGY & IMMUNOLOGY

## 2023-12-08 PROCEDURE — 99213 OFFICE O/P EST LOW 20 MIN: CPT | Performed by: ALLERGY & IMMUNOLOGY

## 2023-12-08 PROCEDURE — 1036F TOBACCO NON-USER: CPT | Performed by: ALLERGY & IMMUNOLOGY

## 2023-12-08 PROCEDURE — 96160 PT-FOCUSED HLTH RISK ASSMT: CPT | Performed by: ALLERGY & IMMUNOLOGY

## 2023-12-08 PROCEDURE — 95117 IMMUNOTHERAPY INJECTIONS: CPT | Performed by: ALLERGY & IMMUNOLOGY

## 2023-12-08 NOTE — ASSESSMENT & PLAN NOTE
Shots are going well but she has itching and large local reactions at injection sites. Her dose will be adjusted. She is late on her immunotherapy.

## 2023-12-08 NOTE — PROGRESS NOTES
Subjective   Patient ID:   [id number]   Grisel Jean is a 43 y.o. female who presents for Follow-up (And allergy shots).    Chief Complaint   Patient presents with    Follow-up     And allergy shots        Started IT in July 2014.  Last tested July 2021 and still allergic.    Since last visit, 08/09/2022, patient states shots are going well.  She still has itching and large bumps at the injection sites.  She takes an antihistamine every day and notices a difference is she misses it.  Some days she has to take 2 but overall allergy symptoms are better.  She reports something still triggers her Sx but she cannot pinpoint them.  She will experience throat itching and nasal congestion.  Patient does use nasal sprays intermittently but states they are ineffective for her.      Patient was in [place] end of Oct 2023 and the leaves were on the trees here when she left.  When she returned, she developed Sx.    Asthma is well-controlled and she will only use her albuterol as needed prior to activity.    Patient and her son returned from vacation in [place].  Her dad's brother and his son live in [place].  She is an  practicing Labor Employment or Business Litigation.      Review of Systems   Skin:         Itching after shots.         Objective     Pulse 70   Wt 78.9 kg (174 lb)   SpO2 97%   BMI 29.41 kg/m²      Physical Exam  Constitutional:       Appearance: Normal appearance.   HENT:      Head: Normocephalic and atraumatic.      Right Ear: External ear normal. There is no impacted cerumen.      Left Ear: External ear normal. There is no impacted cerumen.      Nose: Congestion (Bilateral nasal turbinate edema, left greater than right) present. No rhinorrhea.   Eyes:      Extraocular Movements: Extraocular movements intact.      Conjunctiva/sclera: Conjunctivae normal.      Pupils: Pupils are equal, round, and reactive to light.   Cardiovascular:      Rate and Rhythm: Normal rate and regular rhythm.      Heart  sounds: No murmur heard.     No friction rub. No gallop.   Pulmonary:      Effort: No respiratory distress.      Breath sounds: No wheezing, rhonchi or rales.   Skin:     General: Skin is warm and dry.   Neurological:      Mental Status: She is alert.   Psychiatric:         Mood and Affect: Mood normal.         Behavior: Behavior normal.          Current Outpatient Medications   Medication Sig Dispense Refill    albuterol 90 mcg/actuation inhaler Inhale 2 puffs every 4 hours if needed for shortness of breath (every 4-6 hoursd as needed).      aspirin 81 mg EC tablet Take 1 tablet (81 mg) by mouth once daily.      cetirizine (ZyrTEC) 10 mg capsule Take 1 capsule (10 mg) by mouth once daily.      levonorgestrel (Mirena) 21 mcg/24 hours (8 yrs) 52 mg IUD by intrauterine route.      montelukast (Singulair) 10 mg tablet Take 1 tablet (10 mg) by mouth as needed at bedtime.       No current facility-administered medications for this visit.         No orders of the defined types were placed in this encounter.         Assessment/Plan         Asthma  ACT is 23.  She uses her albuterol only with activity.    Allergic rhinitis due to pollen  Shots are going well but she has itching and large local reactions at injection sites. Her dose will be adjusted. She is late on her immunotherapy.       By signing my name below, I, Danii Arias, attest that this documentation has been prepared under the direction and in the presence of Helen Adames MD.  All medical record entries made by the Scribe were at my direction and personally dictated by me. I have reviewed the chart and agree that the record accurately reflects my personal performance of the history, physical exam, discussion and plan.

## 2023-12-08 NOTE — PATIENT INSTRUCTIONS
Shot was administered today.    With next sensation of throat itch, try to correlate what you consumed to identify any triggers.    Continue Zyrtec as needed.    Start Astepro one inhalation each nostril twice a day as needed.  To increase the efficacy of your nasal spray, be sure to look down while using it.? Spray slightly away from the direction of your nasal septum (the bone in the middle of your nose) and only sniff after you have sprayed - avoid spraying and sniffing at the same time, or else a lot of spray will go down your throat.

## 2023-12-11 PROBLEM — J32.9 CHRONIC CONGESTION OF PARANASAL SINUS: Status: RESOLVED | Noted: 2023-09-03 | Resolved: 2023-12-11

## 2023-12-11 PROBLEM — J30.89 ALLERGY TO DUST: Status: RESOLVED | Noted: 2023-09-03 | Resolved: 2023-12-11

## 2023-12-11 PROBLEM — J30.81 ALLERGY TO ANIMAL DANDER: Status: RESOLVED | Noted: 2023-09-03 | Resolved: 2023-12-11

## 2023-12-12 ENCOUNTER — LAB (OUTPATIENT)
Dept: LAB | Facility: LAB | Age: 43
End: 2023-12-12
Payer: COMMERCIAL

## 2023-12-12 ENCOUNTER — OFFICE VISIT (OUTPATIENT)
Dept: OBSTETRICS AND GYNECOLOGY | Facility: CLINIC | Age: 43
End: 2023-12-12
Payer: COMMERCIAL

## 2023-12-12 VITALS
SYSTOLIC BLOOD PRESSURE: 138 MMHG | WEIGHT: 172 LBS | HEIGHT: 65 IN | DIASTOLIC BLOOD PRESSURE: 80 MMHG | BODY MASS INDEX: 28.66 KG/M2

## 2023-12-12 DIAGNOSIS — Z01.419 WELL WOMAN EXAM WITH ROUTINE GYNECOLOGICAL EXAM: Primary | ICD-10-CM

## 2023-12-12 DIAGNOSIS — Z00.00 ANNUAL PHYSICAL EXAM: ICD-10-CM

## 2023-12-12 LAB
CHOLEST SERPL-MCNC: 188 MG/DL (ref 0–199)
CHOLESTEROL/HDL RATIO: 4.1
HDLC SERPL-MCNC: 45.6 MG/DL
LDLC SERPL CALC-MCNC: 102 MG/DL
NON HDL CHOLESTEROL: 142 MG/DL (ref 0–149)
TRIGL SERPL-MCNC: 201 MG/DL (ref 0–149)
VLDL: 40 MG/DL (ref 0–40)

## 2023-12-12 PROCEDURE — 36415 COLL VENOUS BLD VENIPUNCTURE: CPT

## 2023-12-12 PROCEDURE — 3008F BODY MASS INDEX DOCD: CPT | Performed by: OBSTETRICS & GYNECOLOGY

## 2023-12-12 PROCEDURE — 80061 LIPID PANEL: CPT

## 2023-12-12 PROCEDURE — 1036F TOBACCO NON-USER: CPT | Performed by: OBSTETRICS & GYNECOLOGY

## 2023-12-12 PROCEDURE — 99396 PREV VISIT EST AGE 40-64: CPT | Performed by: OBSTETRICS & GYNECOLOGY

## 2023-12-12 ASSESSMENT — PAIN SCALES - GENERAL: PAINLEVEL: 0-NO PAIN

## 2023-12-14 ENCOUNTER — TELEPHONE (OUTPATIENT)
Dept: PRIMARY CARE | Facility: CLINIC | Age: 43
End: 2023-12-14
Payer: COMMERCIAL

## 2023-12-14 DIAGNOSIS — R93.1 AGATSTON CAC SCORE 100-199: Primary | ICD-10-CM

## 2023-12-14 RX ORDER — ATORVASTATIN CALCIUM 20 MG/1
20 TABLET, FILM COATED ORAL DAILY
Qty: 90 TABLET | Refills: 3 | Status: SHIPPED | OUTPATIENT
Start: 2023-12-14 | End: 2023-12-18

## 2023-12-18 DIAGNOSIS — R93.1 AGATSTON CAC SCORE 100-199: ICD-10-CM

## 2023-12-18 RX ORDER — ATORVASTATIN CALCIUM 20 MG/1
20 TABLET, FILM COATED ORAL DAILY
Qty: 90 TABLET | Refills: 3 | Status: SHIPPED | OUTPATIENT
Start: 2023-12-18 | End: 2024-12-17

## 2024-01-05 ENCOUNTER — CLINICAL SUPPORT (OUTPATIENT)
Dept: ALLERGY | Facility: CLINIC | Age: 44
End: 2024-01-05
Payer: COMMERCIAL

## 2024-01-05 DIAGNOSIS — J30.1 ALLERGIC RHINITIS DUE TO POLLEN, UNSPECIFIED SEASONALITY: ICD-10-CM

## 2024-01-05 PROCEDURE — 95117 IMMUNOTHERAPY INJECTIONS: CPT | Performed by: ALLERGY & IMMUNOLOGY

## 2024-01-29 PROBLEM — J30.2 SEASONAL ALLERGIES: Status: ACTIVE | Noted: 2024-01-29

## 2024-02-02 ENCOUNTER — CLINICAL SUPPORT (OUTPATIENT)
Dept: ALLERGY | Facility: CLINIC | Age: 44
End: 2024-02-02
Payer: COMMERCIAL

## 2024-02-02 DIAGNOSIS — J30.2 SEASONAL ALLERGIES: ICD-10-CM

## 2024-02-02 PROCEDURE — 95117 IMMUNOTHERAPY INJECTIONS: CPT | Performed by: ALLERGY & IMMUNOLOGY

## 2024-02-27 ENCOUNTER — APPOINTMENT (OUTPATIENT)
Dept: ALLERGY | Facility: CLINIC | Age: 44
End: 2024-02-27
Payer: COMMERCIAL

## 2024-03-05 ENCOUNTER — CLINICAL SUPPORT (OUTPATIENT)
Dept: ALLERGY | Facility: CLINIC | Age: 44
End: 2024-03-05
Payer: COMMERCIAL

## 2024-03-05 DIAGNOSIS — J30.2 SEASONAL ALLERGIES: ICD-10-CM

## 2024-03-05 PROCEDURE — 95117 IMMUNOTHERAPY INJECTIONS: CPT | Performed by: ALLERGY & IMMUNOLOGY

## 2024-03-26 ENCOUNTER — CLINICAL SUPPORT (OUTPATIENT)
Dept: ALLERGY | Facility: CLINIC | Age: 44
End: 2024-03-26
Payer: COMMERCIAL

## 2024-03-26 DIAGNOSIS — J30.2 SEASONAL ALLERGIES: ICD-10-CM

## 2024-03-26 PROCEDURE — 95117 IMMUNOTHERAPY INJECTIONS: CPT | Performed by: ALLERGY & IMMUNOLOGY

## 2024-04-23 ENCOUNTER — CLINICAL SUPPORT (OUTPATIENT)
Dept: ALLERGY | Facility: CLINIC | Age: 44
End: 2024-04-23
Payer: COMMERCIAL

## 2024-04-23 DIAGNOSIS — J30.2 SEASONAL ALLERGIES: ICD-10-CM

## 2024-04-23 PROCEDURE — 95117 IMMUNOTHERAPY INJECTIONS: CPT | Performed by: ALLERGY & IMMUNOLOGY

## 2024-05-17 ENCOUNTER — CLINICAL SUPPORT (OUTPATIENT)
Dept: ALLERGY | Facility: CLINIC | Age: 44
End: 2024-05-17
Payer: COMMERCIAL

## 2024-05-17 DIAGNOSIS — J30.2 SEASONAL ALLERGIES: ICD-10-CM

## 2024-05-17 PROCEDURE — 95117 IMMUNOTHERAPY INJECTIONS: CPT | Performed by: ALLERGY & IMMUNOLOGY

## 2024-06-12 DIAGNOSIS — J45.909 ASTHMA, UNSPECIFIED ASTHMA SEVERITY, UNSPECIFIED WHETHER COMPLICATED, UNSPECIFIED WHETHER PERSISTENT (HHS-HCC): Primary | ICD-10-CM

## 2024-06-12 RX ORDER — MONTELUKAST SODIUM 10 MG/1
TABLET ORAL
Qty: 90 TABLET | Refills: 3 | Status: SHIPPED | OUTPATIENT
Start: 2024-06-12

## 2024-06-14 ENCOUNTER — APPOINTMENT (OUTPATIENT)
Dept: ALLERGY | Facility: CLINIC | Age: 44
End: 2024-06-14
Payer: COMMERCIAL

## 2024-06-14 DIAGNOSIS — J30.2 SEASONAL ALLERGIES: ICD-10-CM

## 2024-06-14 PROCEDURE — 95117 IMMUNOTHERAPY INJECTIONS: CPT | Performed by: ALLERGY & IMMUNOLOGY

## 2024-06-24 DIAGNOSIS — Z12.11 SCREENING FOR COLON CANCER: Primary | ICD-10-CM

## 2024-07-08 DIAGNOSIS — Z12.11 SCREENING FOR COLON CANCER: ICD-10-CM

## 2024-07-08 RX ORDER — POLYETHYLENE GLYCOL 3350, SODIUM SULFATE ANHYDROUS, SODIUM BICARBONATE, SODIUM CHLORIDE, POTASSIUM CHLORIDE 236; 22.74; 6.74; 5.86; 2.97 G/4L; G/4L; G/4L; G/4L; G/4L
4000 POWDER, FOR SOLUTION ORAL ONCE
Qty: 4000 ML | Refills: 0 | Status: SHIPPED | OUTPATIENT
Start: 2024-07-08 | End: 2024-07-08

## 2024-07-12 ENCOUNTER — APPOINTMENT (OUTPATIENT)
Dept: ALLERGY | Facility: CLINIC | Age: 44
End: 2024-07-12
Payer: COMMERCIAL

## 2024-07-12 DIAGNOSIS — J30.2 SEASONAL ALLERGIES: ICD-10-CM

## 2024-07-12 PROCEDURE — 95117 IMMUNOTHERAPY INJECTIONS: CPT | Performed by: ALLERGY & IMMUNOLOGY

## 2024-08-12 NOTE — PROGRESS NOTES
Subjective   Patient ID:   [id number]   Grisel Jean is a 43 y.o. female who presents for Allergies and Asthma (ACT 22).    Chief Complaint   Patient presents with    Allergies    Asthma     ACT 22     Patient presents for F/U of allergy IT, started 7-.  Last tested July 2021 and still allergic.    Since last visit, 12-8-23, patient states shots are going well.  In June and July, she had some itching and large bumps at the injection sites.  April, May and June required shots every 3 weeks due to itchy eyes.  She did this a couple of times and wanted to come in last week.  She notes it is not unbearable though.     Asthma is well-controlled on albuterol as needed and before running.  She takes montelukast as needed.      Objective   Pulse 58   SpO2 97%      Physical Exam  Constitutional:       Appearance: Normal appearance.   HENT:      Head: Normocephalic and atraumatic.      Right Ear: External ear normal. There is no impacted cerumen.      Left Ear: External ear normal. There is no impacted cerumen.      Nose: Congestion (right-sided nasal turbinate edema) present. No rhinorrhea.   Eyes:      Extraocular Movements: Extraocular movements intact.      Conjunctiva/sclera: Conjunctivae normal.      Pupils: Pupils are equal, round, and reactive to light.   Cardiovascular:      Rate and Rhythm: Normal rate and regular rhythm.      Heart sounds: Murmur (systolic ejection) heard.      No friction rub. No gallop.   Pulmonary:      Effort: No respiratory distress.      Breath sounds: No wheezing, rhonchi or rales.   Skin:     General: Skin is warm and dry.   Neurological:      Mental Status: She is alert.   Psychiatric:         Mood and Affect: Mood normal.         Behavior: Behavior normal.       Assessment/Plan   Allergic rhinitis due to pollen  Shots are going well. She still has some local reactions, but overall not as severe. She will continue immunotherapy monthly. She can come in at 3 weeks if her symptoms  start to flare.     Otherwise follow up in one year               Asthma ([facility] [place])  Well-controlled with albuterol only as needed and before running.    ACT is 22.    By signing my name below, I, Danii Arias, attest that this documentation has been prepared under the direction and in the presence of Helen Adames MD.  All medical record entries made by the Scribe were at my direction and personally dictated by me. I have reviewed the chart and agree that the record accurately reflects my personal performance of the history, physical exam, discussion and plan.

## 2024-08-12 NOTE — ASSESSMENT & PLAN NOTE
Shots are going well. She still has some local reactions, but overall not as severe. She will continue immunotherapy monthly. She can come in at 3 weeks if her symptoms start to flare.     Otherwise follow up in one year

## 2024-08-13 ENCOUNTER — APPOINTMENT (OUTPATIENT)
Dept: ALLERGY | Facility: CLINIC | Age: 44
End: 2024-08-13
Payer: COMMERCIAL

## 2024-08-13 VITALS — HEART RATE: 58 BPM | OXYGEN SATURATION: 97 %

## 2024-08-13 DIAGNOSIS — J45.909 ASTHMA, UNSPECIFIED ASTHMA SEVERITY, UNSPECIFIED WHETHER COMPLICATED, UNSPECIFIED WHETHER PERSISTENT (HHS-HCC): ICD-10-CM

## 2024-08-13 DIAGNOSIS — J30.1 ALLERGIC RHINITIS DUE TO POLLEN, UNSPECIFIED SEASONALITY: Primary | ICD-10-CM

## 2024-08-13 PROCEDURE — 99213 OFFICE O/P EST LOW 20 MIN: CPT | Performed by: ALLERGY & IMMUNOLOGY

## 2024-08-13 PROCEDURE — 95117 IMMUNOTHERAPY INJECTIONS: CPT | Performed by: ALLERGY & IMMUNOLOGY

## 2024-08-13 PROCEDURE — 96160 PT-FOCUSED HLTH RISK ASSMT: CPT | Performed by: ALLERGY & IMMUNOLOGY

## 2024-08-13 NOTE — PATIENT INSTRUCTIONS
Shot was administered today.    Continue albuterol as needed.    Follow up for shots every 3-4 weeks during the season based on your symptoms.  Otherwise, follow up in 1 year, unless symptoms arise sooner.

## 2024-09-03 ENCOUNTER — APPOINTMENT (OUTPATIENT)
Dept: ALLERGY | Facility: CLINIC | Age: 44
End: 2024-09-03
Payer: COMMERCIAL

## 2024-09-03 DIAGNOSIS — J30.2 SEASONAL ALLERGIES: ICD-10-CM

## 2024-09-03 PROCEDURE — 95117 IMMUNOTHERAPY INJECTIONS: CPT | Performed by: ALLERGY & IMMUNOLOGY

## 2024-09-12 ENCOUNTER — APPOINTMENT (OUTPATIENT)
Dept: CARDIOLOGY | Facility: CLINIC | Age: 44
End: 2024-09-12
Payer: COMMERCIAL

## 2024-09-12 VITALS
HEART RATE: 57 BPM | SYSTOLIC BLOOD PRESSURE: 122 MMHG | HEIGHT: 65 IN | WEIGHT: 176 LBS | BODY MASS INDEX: 29.32 KG/M2 | DIASTOLIC BLOOD PRESSURE: 80 MMHG

## 2024-09-12 DIAGNOSIS — Q22.1 CONGENITAL PULMONARY VALVE STENOSIS (HHS-HCC): Primary | ICD-10-CM

## 2024-09-12 DIAGNOSIS — E78.2 MIXED HYPERLIPIDEMIA: ICD-10-CM

## 2024-09-12 DIAGNOSIS — Q21.12 PATENT FORAMEN OVALE (HHS-HCC): ICD-10-CM

## 2024-09-12 PROCEDURE — 1036F TOBACCO NON-USER: CPT | Performed by: INTERNAL MEDICINE

## 2024-09-12 PROCEDURE — 99214 OFFICE O/P EST MOD 30 MIN: CPT | Performed by: INTERNAL MEDICINE

## 2024-09-12 PROCEDURE — 3008F BODY MASS INDEX DOCD: CPT | Performed by: INTERNAL MEDICINE

## 2024-09-12 ASSESSMENT — ENCOUNTER SYMPTOMS
NEUROLOGICAL NEGATIVE: 1
CARDIOVASCULAR NEGATIVE: 1
EYES NEGATIVE: 1
CONSTITUTIONAL NEGATIVE: 1
ARTHRALGIAS: 1
SHORTNESS OF BREATH: 1
GASTROINTESTINAL NEGATIVE: 1

## 2024-09-12 NOTE — PROGRESS NOTES
Patient:  Grisel Jean  YOB: 1980  MRN: [id number]       Chief Complaint/Active Symptoms:       Grisel Jean is a 44 y.o. female who returns today for cardiac follow-up.    Patient is doing well from cardiac standpoint.  She has occasional episodes of shortness of breath that are usually responsive to inhaler therapy.  She believes most of her episodes of shortness of breath are related to asthma or asthma exacerbations when she has them.  She is still able to do physical activities and as long as she uses her inhaler during some of these asthmatic periods she still able to exercise despite it.    She has no chest pain or discomfort no palpitations syncope or near syncope.  She continues to be followed by the adult congenital clinic for her pulmonic stenosis.  Her extensive evaluation last year identified moderate pulmonic stenosis      Review of Systems   Constitutional: Negative.    Eyes: Negative.    Respiratory:  Positive for shortness of breath.    Cardiovascular: Negative.    Gastrointestinal: Negative.    Genitourinary: Negative.    Musculoskeletal:  Positive for arthralgias.   Neurological: Negative.    All other systems reviewed and are negative.      Objective:     Vitals:    09/12/24 0821   BP: 122/80   Pulse: 57       Vitals:    09/12/24 0821   Weight: 79.8 kg (176 lb)       Allergies:     Allergies   Allergen Reactions    House Dust Unknown     And pollens    Pollen Extracts Hives          Medications:     Current Outpatient Medications   Medication Instructions    albuterol 90 mcg/actuation inhaler 2 puffs, inhalation, Every 4 hours PRN    aspirin 81 mg EC tablet 1 tablet, oral, Daily    atorvastatin (LIPITOR) 20 mg, oral, Daily    cetirizine (ZyrTEC) 10 mg capsule 1 capsule, oral, Daily    levonorgestrel (Mirena) 21 mcg/24 hours (8 yrs) 52 mg IUD intrauterine, In 2017<BR>Out 2025    montelukast (Singulair) 10 mg tablet TAKE 1 TABLET EVERY EVENING AS NEEDED       Physical Examination:  "  GENERAL:  Well developed, well nourished, in no acute distress.  HEENT: NC AT, EOMI with anicteric sclera  NECK:  Supple, no JVD, no bruit.  CHEST:  Symmetric and nontender.  LUNGS:  Clear to auscultation bilaterally, normal respiratory effort.  HEART:  PMI is nondisplaced.  There is a regular rhythm with a normal S1 and no physiologic split or clear P2.  There is a dominantly crescendo with mid-to-late peaking and minimal decrescendo murmur heard at the upper sternal border both on the left and right sides.  This is heard also throughout the precordium there is no radiation to the carotids.  No diastolic murmurs appreciated there is no appreciable S3 or S4.  There is normal distal perfusion and no edema.  ABDOMEN: Soft, NT, ND without palpable organomegaly or bruits  EXTREMITIES:  Warm with good color, no clubbing or cyanosis.  There is no edema noted.  PERIPHERAL VASCULAR:  Pulses present and equally palpable; 2+ throughout.  MUSCULOSKELETAL:   NEURO/PSYCH:  Alert and oriented times three with approppriate behavior and responses. Nonfocal motor examination with normal gait and ambulation  Lymph: No significant palpable lymphadenopathy  Skin: no rash or lesions on exposed skin or reported.    Lab:     CBC:   Lab Results   Component Value Date    WBC 9.1 10/17/2023    RBC 4.62 10/17/2023    HGB 13.6 10/17/2023    HCT 39.8 10/17/2023     10/17/2023        CMP:    Lab Results   Component Value Date     10/17/2023    K 4.2 10/17/2023     10/17/2023    CO2 25 10/17/2023    BUN 13 10/17/2023    CREATININE 0.95 10/17/2023    GLUCOSE 116 (H) 10/17/2023    CALCIUM 9.5 10/17/2023       Magnesium:    No results found for: \"MG\"    Lipid Profile:    Lab Results   Component Value Date    TRIG 201 (H) 12/12/2023    HDL 45.6 12/12/2023    LDLCALC 102 (H) 12/12/2023       TSH:    Lab Results   Component Value Date    TSH 2.10 11/16/2022       BNP:   Lab Results   Component Value Date    BNP 17 09/23/2022    " "    PT/INR:    No results found for: \"PROTIME\", \"INR\"    HgBA1c:    No results found for: \"HGBA1C\"    BMP:  Lab Results   Component Value Date     10/17/2023     (L) 11/16/2022     09/23/2022    K 4.2 10/17/2023    K 3.7 11/16/2022    K 4.1 09/23/2022     10/17/2023     11/16/2022     09/23/2022    CO2 25 10/17/2023    CO2 23 11/16/2022    CO2 24 09/23/2022    BUN 13 10/17/2023    BUN 12 11/16/2022    BUN 16 09/23/2022    CREATININE 0.95 10/17/2023    CREATININE 0.76 11/16/2022    CREATININE 0.93 09/23/2022       Cardiac Enzymes:    No results found for: \"TROPHS\"    Hepatic Function Panel:    Lab Results   Component Value Date    ALKPHOS 89 10/17/2023    ALT 22 10/17/2023    AST 20 10/17/2023    PROT 7.0 10/17/2023    BILITOT 0.6 10/17/2023         Diagnostic Studies:         No nuclear medicine results found for the past 12 months      Radiology:     No orders to display     ASSESSMENT     Problem List Items Addressed This Visit       Hyperlipidemia    Relevant Orders    Comprehensive Metabolic Panel    Lipid Panel    Patent foramen ovale (HHS-HCC)    Congenital pulmonary valve stenosis ([facility]) - Primary    Relevant Orders    Comprehensive Metabolic Panel    CBC       PLAN   1.  Congenital pulmonary valve stenosis.  Moderate by evaluation last year and nothing on the examination today appears to differ from that seen last year.  She is scheduled to see adult congenital clinic next year.  We will alternate our follow-up with them.  We told her if she has any change in her functional capacity and any shortness of breath it is not responsive to her inhaler therapy to let us know.  2.  Patent foramen ovale.  Remains on aspirin therapy no stroke or TIA symptoms.  3.  Hyperlipidemia.  We have requested her lipid panel.  For now until we see something different we will keep her on her current medications and reinforced Mediterranean diet.    Will see the patient in follow-up in 2 " years alternating with our adult congenital colleagues.  Will see her sooner at any time if she develops any other issues.

## 2024-09-16 DIAGNOSIS — J30.89 PERENNIAL ALLERGIC RHINITIS: ICD-10-CM

## 2024-09-16 DIAGNOSIS — J30.81 ANIMAL DANDER ALLERGY: Primary | ICD-10-CM

## 2024-09-16 DIAGNOSIS — J30.1 HAY FEVER: ICD-10-CM

## 2024-09-16 PROCEDURE — 95165 ANTIGEN THERAPY SERVICES: CPT | Performed by: ALLERGY & IMMUNOLOGY

## 2024-09-24 ENCOUNTER — APPOINTMENT (OUTPATIENT)
Dept: ALLERGY | Facility: CLINIC | Age: 44
End: 2024-09-24
Payer: COMMERCIAL

## 2024-09-24 DIAGNOSIS — J30.2 SEASONAL ALLERGIES: ICD-10-CM

## 2024-09-24 PROCEDURE — 95117 IMMUNOTHERAPY INJECTIONS: CPT | Performed by: ALLERGY & IMMUNOLOGY

## 2024-10-08 ENCOUNTER — APPOINTMENT (OUTPATIENT)
Dept: CARDIOLOGY | Facility: CLINIC | Age: 44
End: 2024-10-08
Payer: COMMERCIAL

## 2024-10-22 ENCOUNTER — APPOINTMENT (OUTPATIENT)
Dept: ALLERGY | Facility: CLINIC | Age: 44
End: 2024-10-22
Payer: COMMERCIAL

## 2024-10-29 ENCOUNTER — APPOINTMENT (OUTPATIENT)
Dept: ALLERGY | Facility: CLINIC | Age: 44
End: 2024-10-29
Payer: COMMERCIAL

## 2024-10-29 DIAGNOSIS — J30.2 SEASONAL ALLERGIES: ICD-10-CM

## 2024-10-29 PROCEDURE — 95117 IMMUNOTHERAPY INJECTIONS: CPT | Performed by: ALLERGY & IMMUNOLOGY

## 2024-11-26 ENCOUNTER — APPOINTMENT (OUTPATIENT)
Dept: ALLERGY | Facility: CLINIC | Age: 44
End: 2024-11-26
Payer: COMMERCIAL

## 2024-11-26 DIAGNOSIS — J30.2 SEASONAL ALLERGIES: ICD-10-CM

## 2024-11-26 PROCEDURE — 95117 IMMUNOTHERAPY INJECTIONS: CPT | Performed by: ALLERGY & IMMUNOLOGY

## 2024-12-02 ENCOUNTER — APPOINTMENT (OUTPATIENT)
Dept: PRIMARY CARE | Facility: CLINIC | Age: 44
End: 2024-12-02
Payer: COMMERCIAL

## 2024-12-02 VITALS
HEART RATE: 56 BPM | SYSTOLIC BLOOD PRESSURE: 158 MMHG | TEMPERATURE: 96.1 F | DIASTOLIC BLOOD PRESSURE: 80 MMHG | HEIGHT: 64 IN | OXYGEN SATURATION: 99 % | BODY MASS INDEX: 30.77 KG/M2 | WEIGHT: 180.2 LBS

## 2024-12-02 DIAGNOSIS — Q22.1 CONGENITAL PULMONARY VALVE STENOSIS (HHS-HCC): ICD-10-CM

## 2024-12-02 DIAGNOSIS — E78.2 MIXED HYPERLIPIDEMIA: ICD-10-CM

## 2024-12-02 DIAGNOSIS — I37.0 MODERATE PULMONARY VALVE STENOSIS: ICD-10-CM

## 2024-12-02 DIAGNOSIS — Q21.12 PATENT FORAMEN OVALE (HHS-HCC): ICD-10-CM

## 2024-12-02 DIAGNOSIS — Z00.00 HEALTHCARE MAINTENANCE: Primary | ICD-10-CM

## 2024-12-02 DIAGNOSIS — K58.9 IRRITABLE BOWEL SYNDROME, UNSPECIFIED TYPE: ICD-10-CM

## 2024-12-02 DIAGNOSIS — Z11.59 ENCOUNTER FOR HEPATITIS C SCREENING TEST FOR LOW RISK PATIENT: ICD-10-CM

## 2024-12-02 PROBLEM — R06.02 SHORTNESS OF BREATH ON EXERTION: Status: RESOLVED | Noted: 2023-10-10 | Resolved: 2024-12-02

## 2024-12-02 PROCEDURE — 99396 PREV VISIT EST AGE 40-64: CPT | Performed by: INTERNAL MEDICINE

## 2024-12-02 PROCEDURE — 90656 IIV3 VACC NO PRSV 0.5 ML IM: CPT | Performed by: INTERNAL MEDICINE

## 2024-12-02 PROCEDURE — 1036F TOBACCO NON-USER: CPT | Performed by: INTERNAL MEDICINE

## 2024-12-02 PROCEDURE — 90471 IMMUNIZATION ADMIN: CPT | Performed by: INTERNAL MEDICINE

## 2024-12-02 PROCEDURE — 3008F BODY MASS INDEX DOCD: CPT | Performed by: INTERNAL MEDICINE

## 2024-12-02 ASSESSMENT — ENCOUNTER SYMPTOMS
HEMATURIA: 0
DIFFICULTY URINATING: 0
COUGH: 0
CONSTIPATION: 0
ACTIVITY CHANGE: 0
BLOOD IN STOOL: 0
NAUSEA: 0
VOICE CHANGE: 0
APPETITE CHANGE: 0
DIZZINESS: 0
WHEEZING: 0
UNEXPECTED WEIGHT CHANGE: 0
CHEST TIGHTNESS: 0
VOMITING: 0
TROUBLE SWALLOWING: 0
FATIGUE: 0
TREMORS: 0
DIARRHEA: 0
PALPITATIONS: 0
ARTHRALGIAS: 0
HEADACHES: 0
LIGHT-HEADEDNESS: 0

## 2024-12-02 ASSESSMENT — PATIENT HEALTH QUESTIONNAIRE - PHQ9
2. FEELING DOWN, DEPRESSED OR HOPELESS: NOT AT ALL
1. LITTLE INTEREST OR PLEASURE IN DOING THINGS: NOT AT ALL
SUM OF ALL RESPONSES TO PHQ9 QUESTIONS 1 AND 2: 0

## 2024-12-02 NOTE — PROGRESS NOTES
Subjective   Patient ID: Grisel Jean is a 44 y.o. female who presents for her annual physical   She is the patient of Dr Frances Gomez MD       She thinks her BP is elevated   She states she was taking cold medications yesterday.    She has allergies but they are controlled   She is doing allergy shots routinely with Dr Adames    She is following with Gyn routinely   She has the Mirena   She is tired and wakes a lot during the night   She is taking Mg at night and that helps her sleep     She works out routinely    She follows with cardiology    She has abdominal bloating intermittently   She has regular BM          Review of Systems  All systems negative except those listed in the HPI      Past Medical, Surgical, and Family History reviewed and updated in chart.  Reviewed all medications by prescribing practitioner or clinical pharmacist   (such as prescriptions, OTCs, herbal therapies and supplements) and documented in the medical record       Objective   Visit Vitals  BP (!) 143/92   Pulse 56   Temp 35.6 °C (96.1 °F)    Body mass index is 30.69 kg/m².      Visit Vitals  /80   Pulse 56   Temp 35.6 °C (96.1 °F)    Repeat BP by Dr Tammie Calhoun MD 12/2/24     Physical Exam  Vitals reviewed.   Constitutional:       Appearance: Normal appearance. She is obese.   HENT:      Head: Normocephalic.      Right Ear: Tympanic membrane, ear canal and external ear normal.      Left Ear: Tympanic membrane, ear canal and external ear normal.      Nose: Nose normal.      Mouth/Throat:      Pharynx: Oropharynx is clear.   Eyes:      Conjunctiva/sclera: Conjunctivae normal.   Cardiovascular:      Rate and Rhythm: Normal rate and regular rhythm.      Pulses: Normal pulses.      Heart sounds: Normal heart sounds.      Comments: murmur  Pulmonary:      Effort: Pulmonary effort is normal.      Breath sounds: Normal breath sounds.   Abdominal:      General: Bowel sounds are normal.      Palpations: Abdomen is soft.    Musculoskeletal:         General: Normal range of motion.      Cervical back: Normal range of motion and neck supple.   Skin:     General: Skin is warm.   Neurological:      General: No focal deficit present.      Mental Status: She is alert and oriented to person, place, and time.   Psychiatric:         Mood and Affect: Mood normal.         Behavior: Behavior normal.         Thought Content: Thought content normal.         Judgment: Judgment normal.       Assessment/Plan   Problem List Items Addressed This Visit       Congenital pulmonary valve stenosis (HHS-HCC)    Healthcare maintenance    Relevant Orders    TSH with reflex to Free T4 if abnormal    Hyperlipidemia - Primary    Moderate pulmonary valve stenosis    Patent foramen ovale (HHS-HCC)     Other Visit Diagnoses       Encounter for hepatitis C screening test for low risk patient        Relevant Orders    Hepatitis C antibody              Annual physical completed  Labs ordered     Health Maintenence completed  -  Discussed healthy diet and regular exercise.    -  Physical exam overall unremarkable. Immunizations reviewed and updated accordingly. Healthy lifestyle choices discussed (tobacco avoidance, appropriate alcohol use, avoidance of illicit substances).   -  Patient is wearing seatbelt.   -  Screening lab work ordered as indicated.    -  Age appropriate screening tests reviewed with patient.         Her weight in office is 180 pounds with BMI at 30.69 IO 12/24.  I would like to see her BMI below 28.  Recommend she look into a plant based/ whole foods diet   She works out routinely     Congenital pulmonary valve stenosis  BP not ideal on arrival. Repeat BP improved but not ideal 12/24. She has a murmur. She states she was taking cold medications yesterday. Recommend she monitor her BP at home for a week and e-mail me a copy of her readings or return to the office in a couple days and let us recheck her BP. Recommend if checking BP at home she check  her BP BID.   EKG 10/23 sinus sofie at 57. Left atrial enlargement, left axis deviation, LBBB  She is following with Dr Gordillo     HLD: Reviewed results of CT cardiac score from 11/23 with patient 12/24  Continue atorvastatin 20 mg daily   CT cardiac score 11/23 120.48 mostly in the LAD   Recommend she look into a plant based/ whole foods diet     Allergies:  She is doing allergy shots with Dr Adames and continue   Continue albuterol inhaler Q 4- 6 hours, Zyrtec prn and Singulair 10 mg nightly   Recommend humidifier use in the bedroom during winter months (Oct -Mar)   Continue with Dr Adames     She is following with Gyn routinely. She has the Mirena   She is tired and wakes a lot during the night   She is taking Mg at night and that helps her sleep    Continue OTC Mg oxide 400 mg nightly     Mammo normal 12/23  Breast exam normal 12/24    RTC in 1 year with Dr Gomez for CPE or sooner if needed  RTC prn with me       Scribe Attestation  By signing my name below, IAnamika , Scribe   attest that this documentation has been prepared under the direction and in the presence of Tammie Calhoun MD.

## 2024-12-02 NOTE — PROGRESS NOTES
Subjective   Patient ID: Grisel Jean is a 44 y.o. female who presents for Annual Exam.    44 y.o. 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   HENT:  Negative for ear pain, hearing loss, tinnitus, trouble swallowing and voice change.    Eyes:  Negative for visual disturbance.   Respiratory:  Negative for cough, chest tightness and wheezing.    Cardiovascular:  Negative for chest pain, palpitations and leg swelling.   Gastrointestinal:  Negative for blood in stool, constipation, diarrhea, nausea and vomiting.   Genitourinary:  Negative for difficulty urinating, hematuria and vaginal bleeding.   Musculoskeletal:  Negative for arthralgias.   Skin:  Negative for rash.   Neurological:  Negative for dizziness, tremors, syncope, light-headedness and headaches.       Objective   Vitals:    12/02/24 0833   BP: (!) 143/92   Pulse: 56   Temp: 35.6 °C (96.1 °F)   SpO2: 99%     Physical Exam  Constitutional:       General: She is not in acute distress.     Appearance: She is well-developed. She is not diaphoretic.   HENT:      Head: Normocephalic.      Right Ear: Tympanic membrane normal. There is no impacted cerumen.      Left Ear: Tympanic membrane normal. There is no impacted cerumen.      Nose: Nose normal.      Mouth/Throat:      Mouth: Mucous membranes are moist.      Pharynx: Oropharynx is clear. No oropharyngeal exudate or posterior oropharyngeal erythema.   Eyes:      General: No scleral icterus.     Extraocular Movements: Extraocular movements intact.      Conjunctiva/sclera: Conjunctivae normal.      Pupils: Pupils are equal, round, and reactive to  light.   Neck:      Thyroid: No thyromegaly.      Vascular: No JVD.   Cardiovascular:      Rate and Rhythm: Normal rate and regular rhythm.      Pulses: Normal pulses.      Heart sounds: Normal heart sounds. No murmur heard.     No friction rub. No gallop.   Pulmonary:      Effort: Pulmonary effort is normal. No respiratory distress.      Breath sounds: Normal breath sounds. No wheezing or rales.   Chest:      Chest wall: No tenderness.   Abdominal:      General: Bowel sounds are normal. There is no distension.      Palpations: Abdomen is soft. There is no mass.      Tenderness: There is no abdominal tenderness. There is no rebound.   Musculoskeletal:         General: Normal range of motion.      Cervical back: Normal range of motion and neck supple.   Lymphadenopathy:      Cervical: No cervical adenopathy.   Skin:     General: Skin is warm and dry.   Neurological:      General: No focal deficit present.      Mental Status: She is alert and oriented to person, place, and time.      Deep Tendon Reflexes: Reflexes normal.   Psychiatric:         Mood and Affect: Mood normal.         Thought Content: Thought content normal.       Assessment/Plan   Problem List Items Addressed This Visit       Hyperlipidemia - Primary          Follow up with me in 1 year

## 2024-12-05 ENCOUNTER — LAB (OUTPATIENT)
Dept: LAB | Facility: LAB | Age: 44
End: 2024-12-05
Payer: COMMERCIAL

## 2024-12-05 DIAGNOSIS — E78.2 MIXED HYPERLIPIDEMIA: ICD-10-CM

## 2024-12-05 DIAGNOSIS — Q22.1 CONGENITAL PULMONARY VALVE STENOSIS (HHS-HCC): ICD-10-CM

## 2024-12-05 DIAGNOSIS — Z00.00 HEALTHCARE MAINTENANCE: ICD-10-CM

## 2024-12-05 DIAGNOSIS — Z11.59 ENCOUNTER FOR HEPATITIS C SCREENING TEST FOR LOW RISK PATIENT: ICD-10-CM

## 2024-12-05 LAB
ALBUMIN SERPL BCP-MCNC: 4.4 G/DL (ref 3.4–5)
ALP SERPL-CCNC: 102 U/L (ref 33–110)
ALT SERPL W P-5'-P-CCNC: 29 U/L (ref 7–45)
ANION GAP SERPL CALC-SCNC: 13 MMOL/L (ref 10–20)
AST SERPL W P-5'-P-CCNC: 24 U/L (ref 9–39)
BILIRUB SERPL-MCNC: 0.6 MG/DL (ref 0–1.2)
BUN SERPL-MCNC: 14 MG/DL (ref 6–23)
CALCIUM SERPL-MCNC: 9.8 MG/DL (ref 8.6–10.6)
CHLORIDE SERPL-SCNC: 104 MMOL/L (ref 98–107)
CHOLEST SERPL-MCNC: 182 MG/DL (ref 0–199)
CHOLESTEROL/HDL RATIO: 3.3
CO2 SERPL-SCNC: 27 MMOL/L (ref 21–32)
CREAT SERPL-MCNC: 0.86 MG/DL (ref 0.5–1.05)
EGFRCR SERPLBLD CKD-EPI 2021: 85 ML/MIN/1.73M*2
ERYTHROCYTE [DISTWIDTH] IN BLOOD BY AUTOMATED COUNT: 12.7 % (ref 11.5–14.5)
GLUCOSE SERPL-MCNC: 119 MG/DL (ref 74–99)
HCT VFR BLD AUTO: 45.5 % (ref 36–46)
HCV AB SER QL: NONREACTIVE
HDLC SERPL-MCNC: 55.1 MG/DL
HGB BLD-MCNC: 14.6 G/DL (ref 12–16)
LDLC SERPL CALC-MCNC: 96 MG/DL
MCH RBC QN AUTO: 28.2 PG (ref 26–34)
MCHC RBC AUTO-ENTMCNC: 32.1 G/DL (ref 32–36)
MCV RBC AUTO: 88 FL (ref 80–100)
NON HDL CHOLESTEROL: 127 MG/DL (ref 0–149)
NRBC BLD-RTO: 0 /100 WBCS (ref 0–0)
PLATELET # BLD AUTO: 359 X10*3/UL (ref 150–450)
POTASSIUM SERPL-SCNC: 4.8 MMOL/L (ref 3.5–5.3)
PROT SERPL-MCNC: 7.6 G/DL (ref 6.4–8.2)
RBC # BLD AUTO: 5.18 X10*6/UL (ref 4–5.2)
SODIUM SERPL-SCNC: 139 MMOL/L (ref 136–145)
TRIGL SERPL-MCNC: 153 MG/DL (ref 0–149)
TSH SERPL-ACNC: 2.56 MIU/L (ref 0.44–3.98)
VLDL: 31 MG/DL (ref 0–40)
WBC # BLD AUTO: 9.4 X10*3/UL (ref 4.4–11.3)

## 2024-12-05 PROCEDURE — 86803 HEPATITIS C AB TEST: CPT

## 2024-12-05 PROCEDURE — 36415 COLL VENOUS BLD VENIPUNCTURE: CPT

## 2024-12-05 PROCEDURE — 84443 ASSAY THYROID STIM HORMONE: CPT

## 2024-12-05 PROCEDURE — 80053 COMPREHEN METABOLIC PANEL: CPT

## 2024-12-05 PROCEDURE — 80061 LIPID PANEL: CPT

## 2024-12-05 PROCEDURE — 85027 COMPLETE CBC AUTOMATED: CPT

## 2024-12-07 ENCOUNTER — HOSPITAL ENCOUNTER (OUTPATIENT)
Dept: RADIOLOGY | Facility: CLINIC | Age: 44
Discharge: HOME | End: 2024-12-07
Payer: COMMERCIAL

## 2024-12-07 VITALS — BODY MASS INDEX: 30.75 KG/M2 | WEIGHT: 180.12 LBS | HEIGHT: 64 IN

## 2024-12-07 DIAGNOSIS — Z12.31 SCREENING MAMMOGRAM FOR BREAST CANCER: ICD-10-CM

## 2024-12-07 PROCEDURE — 77063 BREAST TOMOSYNTHESIS BI: CPT

## 2024-12-07 PROCEDURE — 77063 BREAST TOMOSYNTHESIS BI: CPT | Performed by: RADIOLOGY

## 2024-12-07 PROCEDURE — 77067 SCR MAMMO BI INCL CAD: CPT | Performed by: RADIOLOGY

## 2024-12-07 PROCEDURE — 77067 SCR MAMMO BI INCL CAD: CPT

## 2024-12-09 ENCOUNTER — TELEPHONE (OUTPATIENT)
Dept: CARDIOLOGY | Facility: CLINIC | Age: 44
End: 2024-12-09
Payer: COMMERCIAL

## 2024-12-09 NOTE — TELEPHONE ENCOUNTER
----- Message from Mariela Gordillo sent at 12/7/2024  5:16 PM EST -----  Cholesterol is okay but not ideal.  For now we will continue her current medication and heart healthy Mediterranean diet.  ----- Message -----  From: Lab, Background User  Sent: 12/5/2024   1:34 PM EST  To: Mariela Gordillo MD

## 2024-12-12 NOTE — PROGRESS NOTES
Grisel Jean is a 44 y.o. G 1(1001)   patient is      Last Gyn Visit: 12/12/2023 w/ me  Last pap: 11/29/2022 neg X 2  Mammogram: 11/7/2024 unremarkable  Colonoscopy: Due at 44   Contraception:  Mirena placed 2017    CONSTITUTIONAL: Alert and in no acute distress. Well developed, well nourished.   HEAD AND FACE: Head and face: Normal.   EYES: Normal external exam - nonicteric sclera, extraocular movements intact (EOMI) and no ptosis.   EARS, NOSE, MOUTH, AND THROAT: External inspection of ears and nose: Normal.   BREASTS: No masses tenderness nipple discharge or axillary nodes  ABDOMEN: Soft, nontender  GENITOURINARY: External genitalia: Normal. No inguinal lymphadenopathy. Bartholin's Urethral and Skenes Glands: Normal. Urethra: Normal. Bladder: Normal on palpation. Vagina: Normal. Cervix: Normal. Uterus: Normal. Right Adnexa/parametria: Normal. Left Adnexa/parametria: Normal. Inspection of Perianal Area: Normal.   MUSCULOSKELETAL: No joint swelling seen, normal movements of all extremities.   SKIN: Normal skin color and pigmentation, normal skin turgor, and no rash.   NEUROLOGIC: Non-focal. Grossly intact.        PSYCHIATRIC: Alert and oriented x 3. Affect normal to patient baseline. Mood: Appropriate.     Impression plan  Unremarkable well woman exam  Patient encouraged to schedule colonoscopy

## 2024-12-13 NOTE — RESULT ENCOUNTER NOTE
It's Dr. Og Amaral, covering for Dr. Gomez, who is out of the office today.    Thank you for doing the annual mammogram. The radiologist reports no worrisome findings. Please continue monthly self breast exams, as well as annual mammograms. Please contact me with any concerns.     Sincerely,    Og Amaral MD   - covering physician for Dr. Gomez

## 2024-12-17 ENCOUNTER — APPOINTMENT (OUTPATIENT)
Dept: OBSTETRICS AND GYNECOLOGY | Facility: CLINIC | Age: 44
End: 2024-12-17
Payer: COMMERCIAL

## 2024-12-17 VITALS
BODY MASS INDEX: 30.56 KG/M2 | SYSTOLIC BLOOD PRESSURE: 124 MMHG | DIASTOLIC BLOOD PRESSURE: 90 MMHG | WEIGHT: 179 LBS | HEIGHT: 64 IN

## 2024-12-17 DIAGNOSIS — Z01.419 WELL WOMAN EXAM WITH ROUTINE GYNECOLOGICAL EXAM: Primary | ICD-10-CM

## 2024-12-17 PROCEDURE — 99396 PREV VISIT EST AGE 40-64: CPT | Performed by: OBSTETRICS & GYNECOLOGY

## 2024-12-17 PROCEDURE — 3008F BODY MASS INDEX DOCD: CPT | Performed by: OBSTETRICS & GYNECOLOGY

## 2024-12-17 PROCEDURE — 1036F TOBACCO NON-USER: CPT | Performed by: OBSTETRICS & GYNECOLOGY

## 2024-12-17 ASSESSMENT — PAIN SCALES - GENERAL: PAINLEVEL_OUTOF10: 0-NO PAIN

## 2024-12-23 ENCOUNTER — APPOINTMENT (OUTPATIENT)
Dept: ALLERGY | Facility: CLINIC | Age: 44
End: 2024-12-23
Payer: COMMERCIAL

## 2024-12-23 DIAGNOSIS — J30.2 SEASONAL ALLERGIES: ICD-10-CM

## 2024-12-23 PROCEDURE — 95117 IMMUNOTHERAPY INJECTIONS: CPT | Performed by: ALLERGY & IMMUNOLOGY

## 2025-02-19 DIAGNOSIS — R93.1 AGATSTON CAC SCORE 100-199: ICD-10-CM

## 2025-02-19 RX ORDER — ATORVASTATIN CALCIUM 20 MG/1
20 TABLET, FILM COATED ORAL DAILY
Qty: 90 TABLET | Refills: 3 | Status: SHIPPED | OUTPATIENT
Start: 2025-02-19

## 2025-02-26 ENCOUNTER — CLINICAL SUPPORT (OUTPATIENT)
Dept: ALLERGY | Facility: CLINIC | Age: 45
End: 2025-02-26
Payer: COMMERCIAL

## 2025-02-26 DIAGNOSIS — J30.2 SEASONAL ALLERGIES: ICD-10-CM

## 2025-02-26 PROCEDURE — 95117 IMMUNOTHERAPY INJECTIONS: CPT | Performed by: ALLERGY & IMMUNOLOGY

## 2025-03-18 ENCOUNTER — APPOINTMENT (OUTPATIENT)
Dept: ALLERGY | Facility: CLINIC | Age: 45
End: 2025-03-18
Payer: COMMERCIAL

## 2025-03-18 DIAGNOSIS — J30.2 SEASONAL ALLERGIES: ICD-10-CM

## 2025-03-18 PROCEDURE — 95117 IMMUNOTHERAPY INJECTIONS: CPT | Performed by: ALLERGY & IMMUNOLOGY

## 2025-04-15 ENCOUNTER — APPOINTMENT (OUTPATIENT)
Dept: ALLERGY | Facility: CLINIC | Age: 45
End: 2025-04-15
Payer: COMMERCIAL

## 2025-04-15 DIAGNOSIS — J30.2 SEASONAL ALLERGIES: ICD-10-CM

## 2025-04-15 PROCEDURE — 95117 IMMUNOTHERAPY INJECTIONS: CPT | Performed by: ALLERGY & IMMUNOLOGY

## 2025-05-06 ENCOUNTER — APPOINTMENT (OUTPATIENT)
Dept: ALLERGY | Facility: CLINIC | Age: 45
End: 2025-05-06
Payer: COMMERCIAL

## 2025-05-07 ENCOUNTER — CLINICAL SUPPORT (OUTPATIENT)
Dept: ALLERGY | Facility: CLINIC | Age: 45
End: 2025-05-07
Payer: COMMERCIAL

## 2025-05-07 DIAGNOSIS — J30.2 SEASONAL ALLERGIES: ICD-10-CM

## 2025-05-07 PROCEDURE — 95117 IMMUNOTHERAPY INJECTIONS: CPT | Performed by: ALLERGY & IMMUNOLOGY

## 2025-05-13 ENCOUNTER — APPOINTMENT (OUTPATIENT)
Dept: ALLERGY | Facility: CLINIC | Age: 45
End: 2025-05-13
Payer: COMMERCIAL

## 2025-05-14 ENCOUNTER — ANESTHESIA EVENT (OUTPATIENT)
Dept: OPERATING ROOM | Facility: CLINIC | Age: 45
End: 2025-05-14
Payer: COMMERCIAL

## 2025-05-14 ENCOUNTER — HOSPITAL ENCOUNTER (OUTPATIENT)
Dept: OPERATING ROOM | Facility: CLINIC | Age: 45
Discharge: HOME | End: 2025-05-14
Payer: COMMERCIAL

## 2025-05-14 ENCOUNTER — ANESTHESIA (OUTPATIENT)
Dept: OPERATING ROOM | Facility: CLINIC | Age: 45
End: 2025-05-14
Payer: COMMERCIAL

## 2025-05-14 VITALS
HEART RATE: 58 BPM | OXYGEN SATURATION: 98 % | SYSTOLIC BLOOD PRESSURE: 150 MMHG | BODY MASS INDEX: 30.05 KG/M2 | WEIGHT: 180.34 LBS | DIASTOLIC BLOOD PRESSURE: 93 MMHG | TEMPERATURE: 97 F | HEIGHT: 65 IN | RESPIRATION RATE: 16 BRPM

## 2025-05-14 DIAGNOSIS — Z12.11 SCREENING FOR COLON CANCER: ICD-10-CM

## 2025-05-14 PROCEDURE — 45378 DIAGNOSTIC COLONOSCOPY: CPT | Performed by: INTERNAL MEDICINE

## 2025-05-14 PROCEDURE — A45378 PR COLONOSCOPY,DIAGNOSTIC: Performed by: ANESTHESIOLOGIST ASSISTANT

## 2025-05-14 PROCEDURE — 7100000009 HC PHASE TWO TIME - INITIAL BASE CHARGE: Performed by: ANESTHESIOLOGY

## 2025-05-14 PROCEDURE — A45378 PR COLONOSCOPY,DIAGNOSTIC: Performed by: ANESTHESIOLOGY

## 2025-05-14 PROCEDURE — 7100000010 HC PHASE TWO TIME - EACH INCREMENTAL 1 MINUTE: Performed by: ANESTHESIOLOGY

## 2025-05-14 PROCEDURE — 3600000002 HC OR TIME - INITIAL BASE CHARGE - PROCEDURE LEVEL TWO: Performed by: ANESTHESIOLOGY

## 2025-05-14 PROCEDURE — 3700000002 HC GENERAL ANESTHESIA TIME - EACH INCREMENTAL 1 MINUTE: Performed by: ANESTHESIOLOGY

## 2025-05-14 PROCEDURE — 3600000007 HC OR TIME - EACH INCREMENTAL 1 MINUTE - PROCEDURE LEVEL TWO: Performed by: ANESTHESIOLOGY

## 2025-05-14 PROCEDURE — 3700000001 HC GENERAL ANESTHESIA TIME - INITIAL BASE CHARGE: Performed by: ANESTHESIOLOGY

## 2025-05-14 PROCEDURE — 2500000004 HC RX 250 GENERAL PHARMACY W/ HCPCS (ALT 636 FOR OP/ED): Performed by: ANESTHESIOLOGIST ASSISTANT

## 2025-05-14 RX ORDER — LIDOCAINE HYDROCHLORIDE 10 MG/ML
0.1 INJECTION, SOLUTION EPIDURAL; INFILTRATION; INTRACAUDAL; PERINEURAL ONCE
Status: DISCONTINUED | OUTPATIENT
Start: 2025-05-14 | End: 2025-05-15 | Stop reason: HOSPADM

## 2025-05-14 RX ORDER — PROPOFOL 10 MG/ML
INJECTION, EMULSION INTRAVENOUS CONTINUOUS PRN
Status: DISCONTINUED | OUTPATIENT
Start: 2025-05-14 | End: 2025-05-14

## 2025-05-14 RX ORDER — ONDANSETRON HYDROCHLORIDE 2 MG/ML
4 INJECTION, SOLUTION INTRAVENOUS ONCE AS NEEDED
Status: DISCONTINUED | OUTPATIENT
Start: 2025-05-14 | End: 2025-05-15 | Stop reason: HOSPADM

## 2025-05-14 RX ORDER — SODIUM CHLORIDE, SODIUM LACTATE, POTASSIUM CHLORIDE, CALCIUM CHLORIDE 600; 310; 30; 20 MG/100ML; MG/100ML; MG/100ML; MG/100ML
INJECTION, SOLUTION INTRAVENOUS CONTINUOUS PRN
Status: DISCONTINUED | OUTPATIENT
Start: 2025-05-14 | End: 2025-05-14

## 2025-05-14 RX ORDER — SODIUM CHLORIDE, SODIUM LACTATE, POTASSIUM CHLORIDE, CALCIUM CHLORIDE 600; 310; 30; 20 MG/100ML; MG/100ML; MG/100ML; MG/100ML
100 INJECTION, SOLUTION INTRAVENOUS CONTINUOUS
Status: SHIPPED | OUTPATIENT
Start: 2025-05-14 | End: 2025-05-14

## 2025-05-14 RX ORDER — LIDOCAINE HYDROCHLORIDE 20 MG/ML
INJECTION, SOLUTION EPIDURAL; INFILTRATION; INTRACAUDAL; PERINEURAL AS NEEDED
Status: DISCONTINUED | OUTPATIENT
Start: 2025-05-14 | End: 2025-05-14

## 2025-05-14 RX ADMIN — LIDOCAINE HYDROCHLORIDE 100 MG: 20 INJECTION, SOLUTION EPIDURAL; INFILTRATION; INTRACAUDAL; PERINEURAL at 10:22

## 2025-05-14 RX ADMIN — PROPOFOL 400 MCG/KG/MIN: 10 INJECTION, EMULSION INTRAVENOUS at 10:22

## 2025-05-14 RX ADMIN — PROPOFOL 50 MG: 10 INJECTION, EMULSION INTRAVENOUS at 10:27

## 2025-05-14 RX ADMIN — SODIUM CHLORIDE, SODIUM LACTATE, POTASSIUM CHLORIDE, AND CALCIUM CHLORIDE: .6; .31; .03; .02 INJECTION, SOLUTION INTRAVENOUS at 10:18

## 2025-05-14 SDOH — HEALTH STABILITY: MENTAL HEALTH: CURRENT SMOKER: 0

## 2025-05-14 ASSESSMENT — ENCOUNTER SYMPTOMS
DIZZINESS: 0
ABDOMINAL PAIN: 0
FEVER: 0
WHEEZING: 0
HEADACHES: 0
NAUSEA: 0
BLOOD IN STOOL: 0
SPEECH DIFFICULTY: 0
ARTHRALGIAS: 0
VOMITING: 0
COLOR CHANGE: 0
TROUBLE SWALLOWING: 0
DIFFICULTY URINATING: 0
CHILLS: 0
DIARRHEA: 0
SLEEP DISTURBANCE: 0
CONFUSION: 0
ABDOMINAL DISTENTION: 0
SHORTNESS OF BREATH: 0
CONSTIPATION: 0
UNEXPECTED WEIGHT CHANGE: 0
LIGHT-HEADEDNESS: 0
COUGH: 0
JOINT SWELLING: 0

## 2025-05-14 ASSESSMENT — PAIN SCALES - GENERAL
PAINLEVEL_OUTOF10: 0 - NO PAIN

## 2025-05-14 ASSESSMENT — COLUMBIA-SUICIDE SEVERITY RATING SCALE - C-SSRS
2. HAVE YOU ACTUALLY HAD ANY THOUGHTS OF KILLING YOURSELF?: NO
6. HAVE YOU EVER DONE ANYTHING, STARTED TO DO ANYTHING, OR PREPARED TO DO ANYTHING TO END YOUR LIFE?: NO
1. IN THE PAST MONTH, HAVE YOU WISHED YOU WERE DEAD OR WISHED YOU COULD GO TO SLEEP AND NOT WAKE UP?: NO

## 2025-05-14 ASSESSMENT — PAIN - FUNCTIONAL ASSESSMENT
PAIN_FUNCTIONAL_ASSESSMENT: 0-10

## 2025-05-14 NOTE — ANESTHESIA PREPROCEDURE EVALUATION
Patient: Grisel Jean    Procedure Information       Anesthesia Start Date/Time: 05/14/25 1021    Scheduled providers: Alejandro Jeffries MD    Procedure: COLONOSCOPY    Location: [facility] OR            Relevant Problems   Cardiac   (+) Atypical chest pain   (+) Congenital pulmonary valve stenosis (HHS-HCC)   (+) Heart murmur   (+) Hyperlipidemia   (+) Moderate pulmonary valve stenosis   (+) Patent foramen ovale (HHS-HCC)      Pulmonary   (+) Asthma      GI   (+) Irritable bowel syndrome      Endocrine   (+) Class 1 obesity with body mass index (BMI) of 30.0 to 30.9 in adult      HEENT   (+) Seasonal allergies       Clinical information reviewed:   Tobacco  Allergies  Meds   Med Hx  Surg Hx  OB Status  Fam Hx  Soc   Hx        NPO Detail:  NPO/Void Status  Date of Last Liquid: 05/14/25  Time of Last Liquid: 1201  Date of Last Solid: 05/13/25  Time of Last Solid: 1000  Last Intake Type: GI prep         Physical Exam    Airway  Mallampati: II  TM distance: >3 FB  Neck ROM: full  Mouth opening: 3 or more finger widths     Cardiovascular - normal exam  Rhythm: regular  Rate: normal     Dental - normal exam     Pulmonary - normal examBreath sounds clear to auscultation     Abdominal            Anesthesia Plan    History of general anesthesia?: yes  History of complications of general anesthesia?: no    ASA 2     MAC     The patient is not a current smoker.    intravenous induction   Anesthetic plan and risks discussed with patient.    Plan discussed with [facility].

## 2025-05-14 NOTE — DISCHARGE INSTRUCTIONS
During the first 24 hours after your procedure, you should:    - Resume normal diet, unless otherwise directed by your doctor.  - Resume your home medications, unless otherwise directed by your doctor.  - Refrain from driving or operative heavy machinery.  - Drink plenty of liquids.  - Avoid consuming alcohol.  - Avoid strenuous activity or heavy lifting.    After 24 hours, you can resume regular activity.    Call your doctor office immediately ([phone]) or come to the nearest emergency room if you experience:    - Abdominal tenderness  - Blood in your stool or vomit  - Difficulty urinating or passing stools  - Difficulty breathing  - Chest pain  - Fever       If you experience any problems or have any questions following discharge from the GI Lab, please call:   Dr. Jeffries [phone].   To reach your physician after hours call [phone] and ask for the GI physician on call.

## 2025-05-14 NOTE — ANESTHESIA POSTPROCEDURE EVALUATION
Patient: Grisel Jean    Procedure Summary       Date: 05/14/25 Room / Location: [facility] OR    Anesthesia Start: 1021 Anesthesia Stop: 1045    Procedure: COLONOSCOPY Diagnosis: Screening for colon cancer    Scheduled Providers: Alejandro Jeffries MD Responsible Provider: Ezio Aguilar MD    Anesthesia Type: MAC ASA Status: 2            Anesthesia Type: MAC    Vitals Value Taken Time   /93 05/14/25 11:14   Temp 36.1 °C (97 °F) 05/14/25 11:14   Pulse 58 05/14/25 11:14   Resp 16 05/14/25 11:14   SpO2 98 % 05/14/25 11:14       Anesthesia Post Evaluation    Patient participation: complete - patient participated  Level of consciousness: awake  Pain management: satisfactory to patient  Airway patency: patent  Cardiovascular status: acceptable  Respiratory status: acceptable  Hydration status: acceptable  Postoperative Nausea and Vomiting: none  Comments: Did well        There were no known notable events for this encounter.

## 2025-05-14 NOTE — H&P
History Of Present Illness  Grisel Jean is a 44 y.o. female presenting with COLONOSCOPY.     Past Medical History  Medical History[1]    Surgical History  Surgical History[2]     Social History  She reports that she has never smoked. She has never used smokeless tobacco. She reports current alcohol use of about 2.0 standard drinks of alcohol per week. She reports that she does not currently use drugs.    Family History  Family History[3]     Allergies  House dust and Pollen extracts    Review of Systems   Constitutional:  Negative for chills, fever and unexpected weight change.   HENT:  Negative for congestion and trouble swallowing.    Respiratory:  Negative for cough, shortness of breath and wheezing.    Cardiovascular:  Negative for chest pain.   Gastrointestinal:  Negative for abdominal distention, abdominal pain, blood in stool, constipation, diarrhea, nausea and vomiting.   Genitourinary:  Negative for difficulty urinating.   Musculoskeletal:  Negative for arthralgias and joint swelling.   Skin:  Negative for color change.   Neurological:  Negative for dizziness, speech difficulty, light-headedness and headaches.   Psychiatric/Behavioral:  Negative for confusion and sleep disturbance.         Physical Exam  Constitutional:       General: She is awake.      Appearance: Normal appearance.   HENT:      Head: Normocephalic and atraumatic.      Nose: Nose normal.      Mouth/Throat:      Mouth: Mucous membranes are moist.   Eyes:      Pupils: Pupils are equal, round, and reactive to light.   Neck:      Thyroid: No thyroid mass.      Trachea: Phonation normal.   Cardiovascular:      Rate and Rhythm: Normal rate and regular rhythm.   Pulmonary:      Effort: Pulmonary effort is normal. No respiratory distress.      Breath sounds: Normal air entry. No decreased breath sounds, wheezing, rhonchi or rales.   Abdominal:      General: Bowel sounds are normal. There is no distension.      Palpations: Abdomen is soft.       Tenderness: There is no abdominal tenderness.   Musculoskeletal:      Cervical back: Neck supple.      Right lower leg: No edema.      Left lower leg: No edema.   Skin:     General: Skin is warm.      Capillary Refill: Capillary refill takes less than 2 seconds.   Neurological:      General: No focal deficit present.      Mental Status: She is alert and oriented to person, place, and time. Mental status is at baseline.      Cranial Nerves: Cranial nerves 2-12 are intact.      Motor: Motor function is intact.   Psychiatric:         Attention and Perception: Attention and perception normal.         Mood and Affect: Mood normal.         Speech: Speech normal.         Behavior: Behavior normal.          Last Recorded Vitals  There were no vitals taken for this visit.    Relevant Results             Assessment & Plan  Screening for colon cancer          PROCEED  Alejandro Jeffries MD         [1]   Past Medical History:  Diagnosis Date    Abnormal findings on diagnostic imaging of heart and coronary circulation 2022    Abnormal echocardiogram    Benign and innocent cardiac murmurs     Functional murmur    Personal history of diseases of the skin and subcutaneous tissue 2021    History of dermatitis    Personal history of other diseases of the circulatory system 2022    History of cardiac murmur    Personal history of other specified conditions 2021    History of abnormal mammogram   [2]   Past Surgical History:  Procedure Laterality Date     SECTION, CLASSIC  12/10/2013     Section    MR CHEST ANGIO W IV CONTRAST  10/4/2022    MR CHEST ANGIO W IV CONTRAST 10/4/2022 [facility]   [3]   Family History  Problem Relation Name Age of Onset    Allergies Mother      Sinusitis Mother      Asthma Mother's Sister      Allergies Paternal Grandfather      Asthma Paternal Grandfather

## 2025-05-15 ASSESSMENT — PAIN SCALES - GENERAL: PAINLEVEL_OUTOF10: 0 - NO PAIN

## 2025-05-27 ENCOUNTER — APPOINTMENT (OUTPATIENT)
Dept: ALLERGY | Facility: CLINIC | Age: 45
End: 2025-05-27
Payer: COMMERCIAL

## 2025-05-27 DIAGNOSIS — J30.2 SEASONAL ALLERGIES: ICD-10-CM

## 2025-05-27 PROCEDURE — 95115 IMMUNOTHERAPY ONE INJECTION: CPT | Performed by: ALLERGY & IMMUNOLOGY

## 2025-06-16 ENCOUNTER — APPOINTMENT (OUTPATIENT)
Dept: ALLERGY | Facility: CLINIC | Age: 45
End: 2025-06-16
Payer: COMMERCIAL

## 2025-06-24 ENCOUNTER — APPOINTMENT (OUTPATIENT)
Dept: ALLERGY | Facility: CLINIC | Age: 45
End: 2025-06-24
Payer: COMMERCIAL

## 2025-06-24 DIAGNOSIS — J30.2 SEASONAL ALLERGIES: ICD-10-CM

## 2025-06-24 PROCEDURE — 95117 IMMUNOTHERAPY INJECTIONS: CPT | Performed by: ALLERGY & IMMUNOLOGY

## 2025-07-15 ENCOUNTER — APPOINTMENT (OUTPATIENT)
Dept: ALLERGY | Facility: CLINIC | Age: 45
End: 2025-07-15
Payer: COMMERCIAL

## 2025-07-15 DIAGNOSIS — J30.2 SEASONAL ALLERGIES: ICD-10-CM

## 2025-07-15 PROCEDURE — 95117 IMMUNOTHERAPY INJECTIONS: CPT | Performed by: ALLERGY & IMMUNOLOGY

## 2025-07-21 DIAGNOSIS — J45.909 ASTHMA, UNSPECIFIED ASTHMA SEVERITY, UNSPECIFIED WHETHER COMPLICATED, UNSPECIFIED WHETHER PERSISTENT (HHS-HCC): ICD-10-CM

## 2025-07-21 RX ORDER — MONTELUKAST SODIUM 10 MG/1
TABLET ORAL
Qty: 90 TABLET | Refills: 0 | Status: SHIPPED | OUTPATIENT
Start: 2025-07-21

## 2025-08-04 ENCOUNTER — CLINICAL SUPPORT (OUTPATIENT)
Dept: ALLERGY | Facility: CLINIC | Age: 45
End: 2025-08-04
Payer: COMMERCIAL

## 2025-08-04 DIAGNOSIS — J30.2 SEASONAL ALLERGIES: ICD-10-CM

## 2025-08-04 PROCEDURE — 95117 IMMUNOTHERAPY INJECTIONS: CPT | Performed by: ALLERGY & IMMUNOLOGY

## 2025-08-05 ENCOUNTER — APPOINTMENT (OUTPATIENT)
Dept: ALLERGY | Facility: CLINIC | Age: 45
End: 2025-08-05
Payer: COMMERCIAL

## 2025-08-25 ENCOUNTER — CLINICAL SUPPORT (OUTPATIENT)
Dept: ALLERGY | Facility: CLINIC | Age: 45
End: 2025-08-25
Payer: COMMERCIAL

## 2025-08-25 DIAGNOSIS — J30.2 SEASONAL ALLERGIES: ICD-10-CM

## 2025-08-25 PROCEDURE — 95117 IMMUNOTHERAPY INJECTIONS: CPT | Performed by: ALLERGY & IMMUNOLOGY

## 2025-08-26 ENCOUNTER — APPOINTMENT (OUTPATIENT)
Dept: ALLERGY | Facility: CLINIC | Age: 45
End: 2025-08-26
Payer: COMMERCIAL

## 2025-09-16 ENCOUNTER — APPOINTMENT (OUTPATIENT)
Dept: ALLERGY | Facility: CLINIC | Age: 45
End: 2025-09-16
Payer: COMMERCIAL

## 2025-12-10 ENCOUNTER — APPOINTMENT (OUTPATIENT)
Dept: OBSTETRICS AND GYNECOLOGY | Facility: CLINIC | Age: 45
End: 2025-12-10
Payer: COMMERCIAL

## 2025-12-23 ENCOUNTER — APPOINTMENT (OUTPATIENT)
Dept: OBSTETRICS AND GYNECOLOGY | Facility: CLINIC | Age: 45
End: 2025-12-23
Payer: COMMERCIAL

## 2026-01-06 ENCOUNTER — APPOINTMENT (OUTPATIENT)
Dept: OBSTETRICS AND GYNECOLOGY | Facility: CLINIC | Age: 46
End: 2026-01-06
Payer: COMMERCIAL